# Patient Record
Sex: FEMALE | Race: WHITE | Employment: PART TIME | URBAN - METROPOLITAN AREA
[De-identification: names, ages, dates, MRNs, and addresses within clinical notes are randomized per-mention and may not be internally consistent; named-entity substitution may affect disease eponyms.]

---

## 2017-10-10 ENCOUNTER — OFFICE VISIT (OUTPATIENT)
Dept: ENDOCRINOLOGY | Facility: CLINIC | Age: 52
End: 2017-10-10

## 2017-10-10 VITALS
SYSTOLIC BLOOD PRESSURE: 147 MMHG | BODY MASS INDEX: 50.02 KG/M2 | DIASTOLIC BLOOD PRESSURE: 81 MMHG | HEART RATE: 61 BPM | WEIGHT: 293 LBS | HEIGHT: 64 IN

## 2017-10-10 DIAGNOSIS — E03.9 HYPOTHYROIDISM, UNSPECIFIED TYPE: Primary | ICD-10-CM

## 2017-10-10 DIAGNOSIS — E66.01 MORBID OBESITY (H): ICD-10-CM

## 2017-10-10 DIAGNOSIS — E03.9 HYPOTHYROIDISM, UNSPECIFIED TYPE: ICD-10-CM

## 2017-10-10 LAB
T4 FREE SERPL-MCNC: 1.37 NG/DL (ref 0.76–1.46)
TSH SERPL DL<=0.005 MIU/L-ACNC: 1.18 MU/L (ref 0.4–4)

## 2017-10-10 RX ORDER — OXYBUTYNIN CHLORIDE 5 MG/1
TABLET, EXTENDED RELEASE ORAL 2 TIMES DAILY
COMMUNITY

## 2017-10-10 RX ORDER — HYDROCODONE BITARTRATE AND ACETAMINOPHEN 5; 325 MG/1; MG/1
2 TABLET ORAL DAILY
COMMUNITY

## 2017-10-10 RX ORDER — FERROUS SULFATE 325(65) MG
325 TABLET ORAL
COMMUNITY

## 2017-10-10 RX ORDER — LORATADINE 10 MG/1
10 TABLET ORAL DAILY
COMMUNITY

## 2017-10-10 ASSESSMENT — ENCOUNTER SYMPTOMS
DISTURBANCES IN COORDINATION: 1
RECTAL PAIN: 0
RECTAL BLEEDING: 0
JAUNDICE: 0
POOR WOUND HEALING: 0
NECK PAIN: 1
HEADACHES: 1
INCREASED ENERGY: 1
NERVOUS/ANXIOUS: 1
STIFFNESS: 1
TACHYCARDIA: 0
SINUS PAIN: 1
HYPOTENSION: 0
SEIZURES: 0
NIGHT SWEATS: 1
DECREASED LIBIDO: 1
FEVER: 0
VOMITING: 0
LIGHT-HEADEDNESS: 1
WEAKNESS: 1
EXERCISE INTOLERANCE: 1
HYPERTENSION: 0
DIFFICULTY URINATING: 1
NAUSEA: 1
CONSTIPATION: 0
MUSCLE WEAKNESS: 1
ABDOMINAL PAIN: 1
PALPITATIONS: 0
POLYPHAGIA: 1
JOINT SWELLING: 1
NECK MASS: 0
DYSURIA: 0
SKIN CHANGES: 0
EYE IRRITATION: 1
BRUISES/BLEEDS EASILY: 0
LEG SWELLING: 1
TROUBLE SWALLOWING: 1
PANIC: 0
MYALGIAS: 1
SLEEP DISTURBANCES DUE TO BREATHING: 0
POLYDIPSIA: 1
LOSS OF CONSCIOUSNESS: 0
DECREASED CONCENTRATION: 1
DEPRESSION: 1
EYE PAIN: 0
BLOOD IN STOOL: 0
CLAUDICATION: 1
WEIGHT LOSS: 0
CHILLS: 1
DECREASED APPETITE: 1
PARALYSIS: 0
FATIGUE: 1
TREMORS: 0
SYNCOPE: 0
SWOLLEN GLANDS: 1
BOWEL INCONTINENCE: 1
HALLUCINATIONS: 0
HOARSE VOICE: 0
HEARTBURN: 1
HEMATURIA: 0
LEG PAIN: 1
TINGLING: 1
BLOATING: 1
MEMORY LOSS: 1
TASTE DISTURBANCE: 0
ALTERED TEMPERATURE REGULATION: 1
ORTHOPNEA: 0
DOUBLE VISION: 1
DIZZINESS: 1
SPEECH CHANGE: 1
NAIL CHANGES: 1
HOT FLASHES: 1
ARTHRALGIAS: 1
EYE REDNESS: 1
NUMBNESS: 1
DIARRHEA: 1
SORE THROAT: 0
BACK PAIN: 1
EYE WATERING: 0
SINUS CONGESTION: 0
INSOMNIA: 1
SMELL DISTURBANCE: 1
WEIGHT GAIN: 1
MUSCLE CRAMPS: 1

## 2017-10-10 NOTE — PATIENT INSTRUCTIONS
1,700 calorie meal plan to lose 1 lbs weekly without exercise based on REE calc of 2,200  Use meal replacements such as Brittany's meals, Lean Cuisines, Healthy Choice, Smart Ones, Weight Watchers Meals, and Slim Fast and Glucerna shakes and supplement with fresh fruits and vegetables  Please drink a lot of water daily. Most people typically need about 2 liters of water daily and more if they are exercising, have a large weight, or have a fever or illness. Add Crystal Light for flavoring if desired. But no pop with calories in it.  Please keep a food journal of what you eat, calories in what you eat, and mood and bring the journal with you to your next appointment.  Consider using applications for smart phones such as TVSmiles, Recurrent Energy, [employer], SugarSync, Tap&Track, and RelaxM.  Focus on wet volumetrics, meaning, eat more foods that are high in water and fiber such as fruits and vegetables in order to get that full feeling. These are also good for your overall health as well.  Check out Dr. Natalie Suero from [facility] - she has cookbooks with low calorie volumetric recipes  You can try Let's Dish to help you prepare meals for you and your family. Often times, the caloric and nutrition data and serving sizes are available for this food. This can be a time saving maneuver. The website can give you more information http://www.[employer].Easel/  Cob[employer]ivers has Let's Dish & fresh low calorie salads  Check out Hello Fresh at https://www.SkillBoost/food-boxes/classic-box/  Try Cooking Light recipes for low calorie meal preparation and planning  Other food plan options you can search for on the internet and check out include: Nhi HARDIN, [facility]  Please continue to see your health psychologist to discuss how depression and/or mood, anxiety impact your eating.    Medications associated with weight gain: norco, gabapentin, nortriptyline    Labs today: TSH, FT4 since weight change of 297 to 344 since 2/27/17  and thyroid medication needs can change with sig. Changes in body weight.
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
## 2017-10-10 NOTE — MR AVS SNAPSHOT
After Visit Summary   10/10/2017    Shannan Clifford    MRN: [id number]           Patient Information     Date Of Birth          1965        Visit Information        Provider Department      10/10/2017 1:20 PM Gena Bojorquez MD [facility]        Today's Diagnoses     Hypothyroidism, unspecified type    -  1    Morbid obesity (H)          Care Instructions    1,700 calorie meal plan to lose 1 lbs weekly without exercise based on REE calc of 2,200  Use meal replacements such as Brittany's meals, Lean Cuisines, Healthy Choice, Smart Ones, Weight Watchers Meals, and Slim Fast and Glucerna shakes and supplement with fresh fruits and vegetables  Please drink a lot of water daily. Most people typically need about 2 liters of water daily and more if they are exercising, have a large weight, or have a fever or illness. Add Crystal Light for flavoring if desired. But no pop with calories in it.  Please keep a food journal of what you eat, calories in what you eat, and mood and bring the journal with you to your next appointment.  Consider using applications for smart phones such as Microlaunchers, Xactium, EcTownUSA, LoseIt, Tap&Track, and RelaxM.  Focus on wet volumetrics, meaning, eat more foods that are high in water and fiber such as fruits and vegetables in order to get that full feeling. These are also good for your overall health as well.  Check out Dr. Natalie Suero from [facility] - she has cookbooks with low calorie volumetric recipes  You can try Let's Dish to help you prepare meals for you and your family. Often times, the caloric and nutrition data and serving sizes are available for this food. This can be a time saving maneuver. The website can give you more information http://www.Bsmark.Rentify/  C[employer] has Let's Dish & fresh low calorie salads  Check out Hello Fresh at https://www.ProNAi Therapeutics/food-boxes/classic-box/  Try Cooking Light recipes for low  calorie meal preparation and planning  Other food plan options you can search for on the internet and check out include: Nhi HARDIN, [employer] [place]  Please continue to see your health psychologist to discuss how depression and/or mood, anxiety impact your eating.    Medications associated with weight gain: norco, gabapentin, nortriptyline    Labs today: TSH, FT4 since weight change of 297 to 344 since 17 and thyroid medication needs can change with sig. Changes in body weight.                    Follow-ups after your visit        Follow-up notes from your care team     Return in about 3 months (around 1/10/2018).      Future tests that were ordered for you today     Open Future Orders        Priority Expected Expires Ordered    TSH Routine  10/10/2018 10/10/2017    T4 free Routine  10/10/2018 10/10/2017            Who to contact     Please call your clinic at [phone] to:    Ask questions about your health    Make or cancel appointments    Discuss your medicines    Learn about your test results    Speak to your doctor   If you have compliments or concerns about an experience at your clinic, or if you wish to file a complaint, please contact [facility] Patient Relations at [phone] or email us at [email or web address]         Additional Information About Your Visit        GiveForwardharFlash Networks Information     [employer] is an electronic gateway that provides easy, online access to your medical records. With [employer], you can request a clinic appointment, read your test results, renew a prescription or communicate with your care team.     To sign up for [employer] visit the website at [email or web address]   You will be asked to enter the access code listed below, as well as some personal information. Please follow the directions to create your username and password.     Your access code is: [email or web address]  Expires: 2017  1:32 PM     Your access code will  in 90 days. If you  "need help or a new code, please contact your [facility] or call [phone] for assistance.        Care EveryWhere ID     This is your Care EveryWhere ID. This could be used by other organizations to access your [place] medical records  [id number]        Your Vitals Were     Pulse Height BMI (Body Mass Index)             61 1.626 m (5' 4\") 59.08 kg/m2          Blood Pressure from Last 3 Encounters:   10/10/17 147/81   02/18/14 123/81   05/20/13 117/59    Weight from Last 3 Encounters:   10/10/17 (!) 156.1 kg (344 lb 3.2 oz)   02/18/14 (!) 161.9 kg (357 lb)   05/20/13 (!) 172.4 kg (380 lb)               Primary Care Provider Office Phone # Fax #    Scarlet Hernandez [phone]       [address] [address]        Equal Access to Services     THOMPSON SON : Hadii aad ku hadasho Soomaali, waaxda luqadaha, qaybta kaalmada adeegyada, waxay idiin hayfeliciano melo . So [facility] [phone].    ATENCIÓN: Si habla español, tiene a issa disposición servicios gratuitos de asistencia lingüística. [facility] [phone].    We comply with applicable federal civil rights laws and [place] laws. We do not discriminate on the basis of race, color, national origin, age, disability, sex, sexual orientation, or gender identity.            Thank you!     Thank you for choosing [facility]  for your care. Our goal is always to provide you with excellent care. Hearing back from our patients is one way we can continue to improve our services. Please take a few minutes to complete the written survey that you may receive in the mail after your visit with us. Thank you!             Your Updated Medication List - Protect others around you: Learn how to safely use, store and throw away your medicines at www.disposemymeds.org.          This list is accurate as of: 10/10/17  2:56 PM.  Always use your most recent med list.       "             Brand Name Dispense Instructions for use Diagnosis    ARMODAFINIL PO      Take 250 mg by mouth every morning        ferrous sulfate 325 (65 FE) MG tablet    IRON     Take 325 mg by mouth daily (with breakfast)        furosemide 20 MG tablet    LASIX     Take 20 mg by mouth daily.        GABAPENTIN PO      Take 800 mg by mouth 4 times daily.        HYDROcodone-acetaminophen 5-325 MG per tablet    NORCO     Take 2 tablets by mouth daily        hydroxychloroquine 200 MG tablet    PLAQUENIL     Take 200 mg by mouth 2 times daily.        INDERAL  MG 24 hr capsule   Generic drug:  propranolol      Take 120 mg by mouth daily        * levothyroxine 25 MCG tablet    SYNTHROID/LEVOTHROID     Take 1 tablet by mouth daily.        * LEVOTHROID PO      Take 200 mcg by mouth        loratadine 10 MG tablet    CLARITIN     Take 10 mg by mouth daily        meloxicam 15 MG tablet    MOBIC     Take 15 mg by mouth daily.        MICROGESTIN 1.5-30 MG-MCG per tablet   Generic drug:  norethindrone-ethinyl estradiol      Take 1 tablet by mouth daily.        nortriptyline 50 MG capsule    PAMELOR     Take 100 mg by mouth At Bedtime        oxybutynin 5 MG 24 hr tablet    DITROPAN-XL     Take by mouth 2 times daily        pantoprazole 40 MG EC tablet    PROTONIX     Take 40 mg by mouth daily        phentermine 37.5 MG tablet    ADIPEX-P    30 tablet    Take 1 tablet by mouth every morning (before breakfast).    Morbid obesity (H)       PROCHLORPERAZINE RE      Place  rectally as needed.        PROZAC PO      Take 40 mg by mouth daily        RELPAX 40 MG tablet   Generic drug:  eletriptan      Take 40 mg by mouth at onset of headache.        * rOPINIRole 0.5 MG tablet    REQUIP     Take  by mouth 2 times daily.        * rOPINIRole 2 MG tablet    REQUIP     Take 2 mg by mouth At Bedtime.        ZONISAMIDE PO           * Notice:  This list has 4 medication(s) that are the same as other medications prescribed for you. Read the  directions carefully, and ask your doctor or other care provider to review them with you.
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
## 2017-12-20 ENCOUNTER — APPOINTMENT (OUTPATIENT)
Dept: GENERAL RADIOLOGY | Facility: CLINIC | Age: 52
End: 2017-12-20
Attending: EMERGENCY MEDICINE
Payer: COMMERCIAL

## 2017-12-20 ENCOUNTER — HOSPITAL ENCOUNTER (EMERGENCY)
Facility: CLINIC | Age: 52
Discharge: HOME | End: 2017-12-20
Attending: EMERGENCY MEDICINE | Admitting: EMERGENCY MEDICINE
Payer: COMMERCIAL

## 2017-12-20 VITALS
SYSTOLIC BLOOD PRESSURE: 139 MMHG | HEART RATE: 63 BPM | RESPIRATION RATE: 16 BRPM | HEIGHT: 64 IN | DIASTOLIC BLOOD PRESSURE: 89 MMHG | TEMPERATURE: 97.8 F | OXYGEN SATURATION: 100 %

## 2017-12-20 DIAGNOSIS — S93.401A SPRAIN OF RIGHT ANKLE, UNSPECIFIED LIGAMENT, INITIAL ENCOUNTER: ICD-10-CM

## 2017-12-20 DIAGNOSIS — S63.501A WRIST SPRAIN, RIGHT, INITIAL ENCOUNTER: ICD-10-CM

## 2017-12-20 DIAGNOSIS — M25.571 PAIN IN JOINT, ANKLE AND FOOT, RIGHT: ICD-10-CM

## 2017-12-20 DIAGNOSIS — S82.391A OTHER CLOSED FRACTURE OF DISTAL END OF RIGHT TIBIA, INITIAL ENCOUNTER: Primary | ICD-10-CM

## 2017-12-20 DIAGNOSIS — M25.531 RIGHT WRIST PAIN: ICD-10-CM

## 2017-12-20 PROCEDURE — 73560 X-RAY EXAM OF KNEE 1 OR 2: CPT | Mod: LT

## 2017-12-20 PROCEDURE — 29515 APPLICATION SHORT LEG SPLINT: CPT | Mod: RT

## 2017-12-20 PROCEDURE — 73610 X-RAY EXAM OF ANKLE: CPT | Mod: RT

## 2017-12-20 PROCEDURE — 99285 EMERGENCY DEPT VISIT HI MDM: CPT | Mod: 25

## 2017-12-20 PROCEDURE — 73110 X-RAY EXAM OF WRIST: CPT | Mod: RT

## 2017-12-20 PROCEDURE — 25000132 ZZH RX MED GY IP 250 OP 250 PS 637: Performed by: EMERGENCY MEDICINE

## 2017-12-20 RX ORDER — HYDROCODONE BITARTRATE AND ACETAMINOPHEN 5; 325 MG/1; MG/1
2 TABLET ORAL ONCE
Status: COMPLETED | OUTPATIENT
Start: 2017-12-20 | End: 2017-12-20

## 2017-12-20 RX ADMIN — HYDROCODONE BITARTRATE AND ACETAMINOPHEN 2 TABLET: 5; 325 TABLET ORAL at 10:42

## 2017-12-20 NOTE — ED AVS SNAPSHOT
Emergency Department    [address] [facility] [address]    Phone:  [phone]    Fax:  [phone]                                       Shannan Clifford   MRN: [id number]    Department:   Emergency Department   Date of Visit:  12/20/2017           Patient Information     Date Of Birth          1965        Your diagnoses for this visit were:     Other closed fracture of distal end of right tibia, initial encounter     Pain in joint, ankle and foot, right     Right wrist pain     Wrist sprain, right, initial encounter     Sprain of right ankle, unspecified ligament, initial encounter        You were seen by Thomas Hoffman MD.      Follow-up Information     Follow up with Scarlet Hernandez. Schedule an appointment as soon as possible for a visit in 1 week.    Specialty:  Pediatrics    Why:  For re-check of right ankle and right wrist.  May require repeat right ankle xrays at that time.    Contact information:    [address] [address]  [phone]          Discharge Instructions         Understanding Ankle Sprain    The ankle is the joint where the leg and foot meet. Bones are held in place by connective tissue called ligaments. When ankle ligaments are stretched to the point of pain and injury, it is called an ankle sprain. A sprain can tear the ligaments. These tears can be very small but still cause pain. Ankle sprains can be mild or severe.  What causes an ankle sprain?  A sprain may occur when you twist your ankle or bend it too far. This can happen when you stumble or fall. Things that can make an ankle sprain more likely include:    Having had an ankle sprain before    Playing sports that involve running and jumping. Or playing contact sports such as football or hockey.    Wearing shoes that don t support your feet and ankles well    Having ankles with poor strength and flexibility  Symptoms of an ankle sprain  Symptoms may include:    Pain  or soreness in the ankle    Swelling    Redness or bruising    Not being able to walk or put weight on the affected foot    Reduced range of motion in the ankle    A popping or tearing feeling at the time the sprain occurs    An abnormal or dislocated look to the ankle    Instability or too much range of motion in the ankle  Treatment for an ankle sprain  Treatment focuses on reducing pain and swelling, and avoiding further injury. Treatments may include:    Resting the ankle. Avoid putting weight on it. This may mean using crutches until the sprain heals.    Prescription or over-the-counter pain medicines. These help reduce swelling and pain.    Cold packs. These help reduce pain and swelling.    Raising your ankle above your heart. This helps reduce swelling.    Wrapping the ankle with an elastic bandage or ankle brace. This helps reduce swelling and gives some support to the ankle. In rare cases, you may need a cast or boot.    Stretching and other exercises. These improve flexibility and strength.    Heat packs. These may be recommended before doing ankle exercises.  Possible complications of an ankle sprain  An ankle that has been weakened by a sprain can be more likely to have repeated sprains afterward. Doing exercises to strengthen your ankle and improve balance can reduce your risk for repeated sprains. Other possible complications are long-term (chronic) pain or an ankle that remains unstable.  When to call your healthcare provider  Call your healthcare provider right away if you have any of these:    Fever of 100.4 F (38 C) or higher, or as directed    Pain, numbness, discoloration, or coldness in the foot or toes    Pain that gets worse    Symptoms that don t get better, or get worse    New symptoms   Date Last Reviewed: 3/10/2016    6488-1676 [employer]. [address] [place] [address]. All rights reserved. This information is not intended as a substitute for professional  medical care. Always follow your healthcare professional's instructions.          Discharge References/Attachments     WRIST SPRAIN (ENGLISH)      Future Appointments        Provider Department Dept Phone Center    1/16/2018 1:00 PM Gena Bojorquez MD [facility] [phone] [facility]      24 Hour Appointment Hotline       To make an appointment at any [facility], call [phone] ([phone]). If you don't have a family doctor or clinic, we will help you find one. [facility] are conveniently located to serve the needs of you and your family.             Review of your medicines      Our records show that you are taking the medicines listed below. If these are incorrect, please call your family doctor or clinic.        Dose / Directions Last dose taken    ARMODAFINIL PO   Dose:  250 mg        Take 250 mg by mouth every morning   Refills:  0        ferrous sulfate 325 (65 FE) MG tablet   Commonly known as:  IRON   Dose:  325 mg        Take 325 mg by mouth daily (with breakfast)   Refills:  0        furosemide 20 MG tablet   Commonly known as:  LASIX   Dose:  20 mg        Take 20 mg by mouth daily.   Refills:  0        GABAPENTIN PO   Dose:  800 mg        Take 800 mg by mouth 4 times daily.   Refills:  0        HYDROcodone-acetaminophen 5-325 MG per tablet   Commonly known as:  NORCO   Dose:  2 tablet        Take 2 tablets by mouth daily   Refills:  0        hydroxychloroquine 200 MG tablet   Commonly known as:  PLAQUENIL   Dose:  200 mg        Take 200 mg by mouth 2 times daily.   Refills:  0        INDERAL  MG 24 hr capsule   Dose:  120 mg   Indication:  Migraine Headache   Generic drug:  propranolol        Take 120 mg by mouth daily   Refills:  0        * levothyroxine 25 MCG tablet   Commonly known as:  SYNTHROID/LEVOTHROID   Dose:  1 tablet        Take 1 tablet by mouth daily.   Refills:  0        * LEVOTHROID PO   Dose:  200 mcg        Take 200 mcg by mouth    Refills:  0        loratadine 10 MG tablet   Commonly known as:  CLARITIN   Dose:  10 mg        Take 10 mg by mouth daily   Refills:  0        meloxicam 15 MG tablet   Commonly known as:  MOBIC   Dose:  15 mg        Take 15 mg by mouth daily.   Refills:  0        MICROGESTIN 1.5-30 MG-MCG per tablet   Dose:  1 tablet   Generic drug:  norethindrone-ethinyl estradiol        Take 1 tablet by mouth daily.   Refills:  0        nortriptyline 50 MG capsule   Commonly known as:  PAMELOR   Dose:  100 mg        Take 100 mg by mouth At Bedtime   Refills:  0        oxybutynin 5 MG 24 hr tablet   Commonly known as:  DITROPAN-XL        Take by mouth 2 times daily   Refills:  0        pantoprazole 40 MG EC tablet   Commonly known as:  PROTONIX   Dose:  40 mg        Take 40 mg by mouth daily   Refills:  0        phentermine 37.5 MG tablet   Commonly known as:  ADIPEX-P   Dose:  37.5 mg   Quantity:  30 tablet        Take 1 tablet by mouth every morning (before breakfast).   Refills:  0        PROCHLORPERAZINE RE        Place  rectally as needed.   Refills:  0        PROZAC PO   Dose:  40 mg        Take 40 mg by mouth daily   Refills:  0        RELPAX 40 MG tablet   Dose:  40 mg   Generic drug:  eletriptan        Take 40 mg by mouth at onset of headache.   Refills:  0        * rOPINIRole 0.5 MG tablet   Commonly known as:  REQUIP        Take  by mouth 2 times daily.   Refills:  0        * rOPINIRole 2 MG tablet   Commonly known as:  REQUIP   Dose:  2 mg        Take 2 mg by mouth At Bedtime.   Refills:  0        ZONISAMIDE PO        Refills:  0        * Notice:  This list has 4 medication(s) that are the same as other medications prescribed for you. Read the directions carefully, and ask your doctor or other care provider to review them with you.            Procedures and tests performed during your visit     Ankle XR, G/E 3 views, right    Wrist XR, G/E 3 views, right    XR Knee Left 1/2 Views      Orders Needing Specimen  Collection     None      Pending Results     No orders found from 12/18/2017 to 12/21/2017.            Pending Culture Results     No orders found from 12/18/2017 to 12/21/2017.            Pending Results Instructions     If you had any lab results that were not finalized at the time of your Discharge, you can call the ED Lab Result RN at [phone]. You will be contacted by this team for any positive Lab results or changes in treatment. The nurses are available 7 days a week from 10A to 6:30P.  You can leave a message 24 hours per day and they will return your call.        Test Results From Your Hospital Stay        12/20/2017 11:36 AM      Narrative     RIGHT WRIST THREE OR MORE VIEWS  12/20/2017 11:27 AM    HISTORY:  Fall on outstretched right hand.     COMPARISON:  None.        Impression     IMPRESSION:  No distal radius fracture identified. There is a  transverse band of density in the distal ulna, likely a normal  variant; a very subtle compression fracture not excluded but  considered unlikely. Subchondral degenerative cyst in the lunate.     RYAN PATTON MD         12/20/2017 11:36 AM      Narrative     RIGHT ANKLE THREE OR MORE VIEWS  12/20/2017 11:27 AM    HISTORY:  Right ankle pain after falling.    COMPARISON:  None.        Impression     IMPRESSION:  Mild soft tissue swelling over the lateral malleolus.  Thin linear lucency in the posterior distal tibia on the lateral view  could represent a subtle nondisplaced fracture if this correlates  clinically. It could represent overlapping accessory ossicle.  Degenerative changes at the ankle with cephalad and lateral joint  space narrowing. Pes planus. Prominent inferior calcaneal spur.    RYAN PATTON MD               12/20/2017 11:36 AM      Narrative     LEFT KNEE ONE TO TWO VIEWS  12/20/2017 11:28 AM    HISTORY:  Fall, left knee pain, chronic pain.     COMPARISON:  None.        Impression     IMPRESSION:  No acute osseous process. Small joint  effusion.  Three-compartment osseous degenerative changes with near complete  medial joint space loss.     RYAN PATTON MD                Clinical Quality Measure: Blood Pressure Screening     Your blood pressure was checked while you were in the emergency department today. The last reading we obtained was  BP: 139/66 . Please read the guidelines below about what these numbers mean and what you should do about them.  If your systolic blood pressure (the top number) is less than 120 and your diastolic blood pressure (the bottom number) is less than 80, then your blood pressure is normal. There is nothing more that you need to do about it.  If your systolic blood pressure (the top number) is 120-139 or your diastolic blood pressure (the bottom number) is 80-89, your blood pressure may be higher than it should be. You should have your blood pressure rechecked within a year by a primary care provider.  If your systolic blood pressure (the top number) is 140 or greater or your diastolic blood pressure (the bottom number) is 90 or greater, you may have high blood pressure. High blood pressure is treatable, but if left untreated over time it can put you at risk for heart attack, stroke, or kidney failure. You should have your blood pressure rechecked by a primary care provider within the next 4 weeks.  If your provider in the emergency department today gave you specific instructions to follow-up with your doctor or provider even sooner than that, you should follow that instruction and not wait for up to 4 weeks for your follow-up visit.        Thank you for choosing [facility]       Thank you for choosing [facility] for your care. Our goal is always to provide you with excellent care. Hearing back from our patients is one way we can continue to improve our services. Please take a few minutes to complete the written survey that you may receive in the mail after you visit with us. Thank you!        MyChart Information      "[employer] lets you send messages to your doctor, view your test results, renew your prescriptions, schedule appointments and more. To sign up, go to [email or web address] . Click on \"Log in\" on the left side of the screen, which will take you to the Welcome page. Then click on \"Sign up Now\" on the right side of the page.     You will be asked to enter the access code listed below, as well as some personal information. Please follow the directions to create your username and password.     Your access code is: [email or web address]  Expires: 3/20/2018 12:16 PM     Your access code will  in 90 days. If you need help or a new code, please call your [facility] clinic or [phone].        Care EveryWhere ID     This is your Care EveryWhere ID. This could be used by other organizations to access your [facility] medical records  [id number]        Equal Access to Services     THOMPSON SON : Yohannes Vergara, tresa payton, gavin coello, tristan melo . So [facility] [phone].    ATENCIÓN: Si habla español, tiene a issa disposición servicios gratuitos de asistencia lingüística. Llame al [phone].    We comply with applicable federal civil rights laws and [place] laws. We do not discriminate on the basis of race, color, national origin, age, disability, sex, sexual orientation, or gender identity.            After Visit Summary       This is your record. Keep this with you and show to your community pharmacist(s) and doctor(s) at your next visit.                  "

## 2017-12-20 NOTE — ED AVS SNAPSHOT
Emergency Department    [address] [place] [address]    Phone:  [phone]    Fax:  [phone]                                       Shannan Clifford   MRN: [id number]    Department:   Emergency Department   Date of Visit:  12/20/2017           After Visit Summary Signature Page     I have received my discharge instructions, and my questions have been answered. I have discussed any challenges I see with this plan with the nurse or doctor.    ..........................................................................................................................................  Patient/Patient Representative Signature      ..........................................................................................................................................  Patient Representative Print Name and Relationship to Patient    ..................................................               ................................................  Date                                            Time    ..........................................................................................................................................  Reviewed by Signature/Title    ...................................................              ..............................................  Date                                                            Time

## 2017-12-20 NOTE — DISCHARGE INSTRUCTIONS
Understanding Ankle Sprain    The ankle is the joint where the leg and foot meet. Bones are held in place by connective tissue called ligaments. When ankle ligaments are stretched to the point of pain and injury, it is called an ankle sprain. A sprain can tear the ligaments. These tears can be very small but still cause pain. Ankle sprains can be mild or severe.  What causes an ankle sprain?  A sprain may occur when you twist your ankle or bend it too far. This can happen when you stumble or fall. Things that can make an ankle sprain more likely include:    Having had an ankle sprain before    Playing sports that involve running and jumping. Or playing contact sports such as football or hockey.    Wearing shoes that don t support your feet and ankles well    Having ankles with poor strength and flexibility  Symptoms of an ankle sprain  Symptoms may include:    Pain or soreness in the ankle    Swelling    Redness or bruising    Not being able to walk or put weight on the affected foot    Reduced range of motion in the ankle    A popping or tearing feeling at the time the sprain occurs    An abnormal or dislocated look to the ankle    Instability or too much range of motion in the ankle  Treatment for an ankle sprain  Treatment focuses on reducing pain and swelling, and avoiding further injury. Treatments may include:    Resting the ankle. Avoid putting weight on it. This may mean using crutches until the sprain heals.    Prescription or over-the-counter pain medicines. These help reduce swelling and pain.    Cold packs. These help reduce pain and swelling.    Raising your ankle above your heart. This helps reduce swelling.    Wrapping the ankle with an elastic bandage or ankle brace. This helps reduce swelling and gives some support to the ankle. In rare cases, you may need a cast or boot.    Stretching and other exercises. These improve flexibility and strength.    Heat packs. These may be recommended before doing  ankle exercises.  Possible complications of an ankle sprain  An ankle that has been weakened by a sprain can be more likely to have repeated sprains afterward. Doing exercises to strengthen your ankle and improve balance can reduce your risk for repeated sprains. Other possible complications are long-term (chronic) pain or an ankle that remains unstable.  When to call your healthcare provider  Call your healthcare provider right away if you have any of these:    Fever of 100.4 F (38 C) or higher, or as directed    Pain, numbness, discoloration, or coldness in the foot or toes    Pain that gets worse    Symptoms that don t get better, or get worse    New symptoms   Date Last Reviewed: 3/10/2016    9327-6530 [employer]. [address] [place] [address], [place] [address]. All rights reserved. This information is not intended as a substitute for professional medical care. Always follow your healthcare professional's instructions.

## 2017-12-20 NOTE — ED NOTES
Bed: ED07  Expected date:   Expected time:   Means of arrival:   Comments:  423  52 F fall/wrist pain  1008

## 2017-12-20 NOTE — ED PROVIDER NOTES
History     Chief Complaint:  Fall      HPI   Shannan Clifford is a right-hand dominant 52 year old female with a history of fibromyalgia and dystonia who presents to the emergency department today for evaluation of injuries sustained in a fall. The patient reports that she was on the way to Urgent Care for evaluation of her chronic left knee pain this morning when she slipped and mechanically fell onto her right wrist. She describes right wrist pain, bilateral knee pain, and right ankle pain. She denies directly impacting her knees during her fall or hitting her head. She notes that she usually takes two Norco daily for her fibromyalgia but states that she did not do so yet today. She states that her pain medicine is managed by her primary care provider. The patient notes that she was able to walk after this fall but with pain. She reports that she was going to urgent care for left knee pain, intermittent swelling, and instability. She notes that she takes synthroid for hypothyroidism.    Allergies:  Topiramate  Wellbutrin [Bupropion Hydrobromide]     Medications:    Claritin  Armodafinil  Oxybutynin  Norco  Zonisamide  Prozac  Phentermine  Synthroid  Levothroid  Inderal  Gabapentin  Protonix  Mobic  Plaquenil  Requip  Pamelor  Microgestin  Prochlorperazine  Replax  Lasix    Past Medical History:    Anemia  Dystonia  Fatty liver disease, nonalcoholic  Fibromyalgia  Hypothyroid  Migraine  Obstructive sleep apnea  Osteoarthritis  Restless leg syndrome  Vertigo    Past Surgical History:    Deviated septum  Myringotomy, insert tube bilateral, combined    Family History:    Father: Obesity, hypertension, heart disease, lipids  Mother: Obesity, ovarian cancer  Paternal Grandfather: Obesity, hypertension ,diabetes, lipids, heart disease  Paternal Grandmother: Skin cancer, cerebrovascular disease, lipids, heart disease  Maternal Grandfather: Prostate cancer  Maternal Grandmother: CHF, bone cancer  Paternal Uncle:  "MI    Social History:  Smoking Status: Never Smoker  Smokeless Tobacco: Never Used  Alcohol Use: Positive  Marital Status:  Single     Review of Systems   HENT:        Negative for head injury.   Musculoskeletal:        Positive for right wrist pain, bilateral knee pain, and right ankle pain.   All other systems reviewed and are negative.    Physical Exam     Patient Vitals for the past 24 hrs:   BP Temp Temp src Pulse Resp SpO2 Height   12/20/17 1225 139/89 - - 63 16 - -   12/20/17 1157 119/80 - - 71 16 - -   12/20/17 1019 139/66 97.8  F (36.6  C) Oral 73 16 100 % 1.626 m (5' 4\")     Physical Exam  General: Resting uncomfortably on the gurney, morbidly obese  Head:  The scalp, face, and head appear normal  Eyes:  The pupils are normal    Conjunctivae and sclera appear normal  ENT:    The nose is normal    Ears/pinnae are normal  Neck:  Normal range of motion  Cardiovascular: Regular rate and rhythm. No murmur.  Respiratory: Clear to auscultation bilaterally.  MS:  Right Hand:    The finger flexors (FDS/FDP) are intact    The finger extensors are intact    The thumb exam is normal, including:    Adduction, abduction, flexion, extension, opposition    There are no sensory deficits    Median, Ulnar, and Radial nerve function is normal    Radial artery pulsations are normal    Capillary refill is normal  MS:  Right Leg:    Thigh:     Normal    Knee:     Normal; there is no effusion    Proximal fibula:   Normal and non-tender    Tibia:     Long shaft of the tibia is normal    Alba-lateral malleolar ligaments: Mild swelling and tender    Medial collateral (Deltoid) ligament: Normal    Lateral malleolus:   Mildly-tender    Medial Malleolus:   Non-tender      Achilles tendon:   Normal, intact    5th MT base:    Normal and non-tender    Foot bones:    Normal and non-tender       Capillary Refill:   Normal.    Sensation:    Foot and ankle sensation are normal  Left knee: No effusion compared to right. Limited exam due to " morbid obesity. Tenderness to anterior compression of patella. Patient ambulatory after fall.  Skin:  No rash or lesions noted.  Neuro: Speech is normal and fluent  Psych:  Awake. Alert.  Normal affect.      Appropriate interactions    Emergency Department Course     Imaging:  Radiology findings were communicated with the patient who voiced understanding of the findings.    Ankle XR, G/E 3 views, right  Mild soft tissue swelling over the lateral malleolus.  Thin linear lucency in the posterior distal tibia on the lateral view  could represent a subtle nondisplaced fracture if this correlates  clinically. It could represent overlapping accessory ossicle.  Degenerative changes at the ankle with cephalad and lateral joint  space narrowing. Pes planus. Prominent inferior calcaneal spur.  RYAN PATTON MD  Reading per radiology    Wrist XR, G/E 3 views, right  No distal radius fracture identified. There is a  transverse band of density in the distal ulna, likely a normal  variant; a very subtle compression fracture not excluded but  considered unlikely. Subchondral degenerative cyst in the lunate.   RYAN PATTON MD  Reading per radiology    XR Knee Left 1/2 Views  No acute osseous process. Small joint effusion.  Three-compartment osseous degenerative changes with near complete  medial joint space loss.   RYAN PATTON MD    Procedures:  PROCEDURE: Ankle Gel Splint Placement.    Ankle Gel Splint was applied to the right lower extremity and after placement I checked and adjusted the fit to ensure proper positioning.  The patient was more comfortable with the splint in place.  Sensation and circulation are intact after splint placement.    PROCEDURE: Wrist Splint Placement.    Wrist splint was applied to the right upper extremity and after placement I checked and adjusted the fit to ensure proper positioning.  The patient was more comfortable with the splint in place.  Sensation and circulation are intact after splint  placement.    Interventions:  1042 Norco 5-325 mg two tablets PO    Emergency Department Course:  Nursing notes and vitals reviewed.  I performed an exam of the patient as documented above.   The patient was sent for a Ankle XR, G/E 3 views, right, Wrist XR, G/E 3 views, right, and XR Knee Left 1/2 Views while in the emergency department, results above.   1154 I rechecked the patient.  I discussed the treatment plan with the patient. They expressed understanding of this plan and consented to discharge. They will be discharged home with instructions for care and follow up. In addition, the patient will return to the emergency department if their symptoms persist, worsen, if new symptoms arise or if there is any concern.  All questions were answered.  I personally reviewed the laboratory results with the patient and answered all related questions prior to discharge.    Impression & Plan      Medical Decision Making:  Shannan Clifford is a 52 year old female who presents with a fall on outstretched right hand. Patient with right ankle pain, right wrist pain, and left knee pain. Patient with chronic left knee pain. X-ray shows no acute fracture. There is a small effusion. No overlying redness or concern for infection to the left knee. It does look comparable to the right although patient is morbidly obese and physical exam makes full knee evaluation difficult to interpret. Due to patient's chronic joint discomfort, I suspect this is likely associated with that at this time. In the setting of an acute fall I am much more concerned about the right wrist and right ankle today, where she had likely right ankle sprain and right wrist sprain. Right wrist x-ray shows no distal radius fracture. Patient with no pain or tenderness to the ulnar aspect of the wrist and had full range of motion. Low concern for a subtle compression fracture as stated in the x-ray read. In conjunction with the clinical exam, I suspect this is most  likely a wrist sprain. Patient will be placed in a removable wrist splint for comfort.     Patient's right ankle shows some mild soft tissue swelling over the lateral malleolus. There is a small lucency in the posterior distal tibia that could be a subtle, non-displaced fracture. I suspect this is likely from a moderate to severe sprain of the right ankle. Due to patient's morbid obesity, I was unable to provide a walking post-operative boot for the patient. This would have constricted the proximal portion of her lower leg too much and was too uncomfortable to maintain. We did opt to provide a ankle gel splint which helped to provide comfort to the ankle joint. With no obvious deformities to either extremities, it is reasonable to continue with tylenol and ibuprofen and other out-patient medications as patient already does.  Will f/u with PCP in one week for re-assessment of right ankle and likely additional xray imaging at that time to ensure no progression of injury. Patient understood close return precautions for development of worsening pain. All questions answered prior to discharge. Discharged home.    Diagnosis:    ICD-10-CM    1. Other closed fracture of distal end of right tibia, initial encounter S82.391A    2. Pain in joint, ankle and foot, right M25.571    3. Right wrist pain M25.531    4. Wrist sprain, right, initial encounter S63.501A    5. Sprain of right ankle, unspecified ligament, initial encounter S93.401A        Disposition:  The patient is discharged to home.    Scribe Disclosure:  I, Benito Lima, am serving as a scribe at 12:11 PM on 12/20/2017 to document services personally performed by Thomas Hoffman MD based on my observations and the provider's statements to me.   EMERGENCY DEPARTMENT       Thomas Hoffman MD  12/20/17 9705

## 2017-12-20 NOTE — LETTER
December 20, 2017      To Whom It May Concern:      Shannan Clifford was seen in our Emergency Department today, 12/20/17.  I expect her condition to improve over the next 5 days.  She may return to work after 12/25/2017.    Sincerely,        Thomas Hoffman MD

## 2018-01-05 ENCOUNTER — HOSPITAL ENCOUNTER (EMERGENCY)
Facility: CLINIC | Age: 53
Discharge: HOME | End: 2018-01-05
Attending: EMERGENCY MEDICINE | Admitting: EMERGENCY MEDICINE
Payer: COMMERCIAL

## 2018-01-05 VITALS
HEIGHT: 64 IN | TEMPERATURE: 98 F | BODY MASS INDEX: 50.02 KG/M2 | OXYGEN SATURATION: 97 % | DIASTOLIC BLOOD PRESSURE: 90 MMHG | RESPIRATION RATE: 16 BRPM | SYSTOLIC BLOOD PRESSURE: 131 MMHG | HEART RATE: 84 BPM | WEIGHT: 293 LBS

## 2018-01-05 DIAGNOSIS — K52.9 GASTROENTERITIS: ICD-10-CM

## 2018-01-05 LAB
ANION GAP SERPL CALCULATED.3IONS-SCNC: 5 MMOL/L (ref 3–14)
BASOPHILS # BLD AUTO: 0 10E9/L (ref 0–0.2)
BASOPHILS NFR BLD AUTO: 0.2 %
BUN SERPL-MCNC: 13 MG/DL (ref 7–30)
CALCIUM SERPL-MCNC: 8.8 MG/DL (ref 8.5–10.1)
CHLORIDE SERPL-SCNC: 106 MMOL/L (ref 94–109)
CO2 SERPL-SCNC: 30 MMOL/L (ref 20–32)
CREAT SERPL-MCNC: 0.55 MG/DL (ref 0.52–1.04)
DIFFERENTIAL METHOD BLD: NORMAL
EOSINOPHIL # BLD AUTO: 0.1 10E9/L (ref 0–0.7)
EOSINOPHIL NFR BLD AUTO: 1 %
ERYTHROCYTE [DISTWIDTH] IN BLOOD BY AUTOMATED COUNT: 12.8 % (ref 10–15)
GFR SERPL CREATININE-BSD FRML MDRD: >90 ML/MIN/1.7M2
GLUCOSE SERPL-MCNC: 88 MG/DL (ref 70–99)
HCT VFR BLD AUTO: 39 % (ref 35–47)
HGB BLD-MCNC: 13.8 G/DL (ref 11.7–15.7)
IMM GRANULOCYTES # BLD: 0 10E9/L (ref 0–0.4)
IMM GRANULOCYTES NFR BLD: 0.4 %
LYMPHOCYTES # BLD AUTO: 1.7 10E9/L (ref 0.8–5.3)
LYMPHOCYTES NFR BLD AUTO: 20.5 %
MCH RBC QN AUTO: 29.9 PG (ref 26.5–33)
MCHC RBC AUTO-ENTMCNC: 35.4 G/DL (ref 31.5–36.5)
MCV RBC AUTO: 84 FL (ref 78–100)
MONOCYTES # BLD AUTO: 0.6 10E9/L (ref 0–1.3)
MONOCYTES NFR BLD AUTO: 7.2 %
NEUTROPHILS # BLD AUTO: 5.8 10E9/L (ref 1.6–8.3)
NEUTROPHILS NFR BLD AUTO: 70.7 %
NRBC # BLD AUTO: 0 10*3/UL
NRBC BLD AUTO-RTO: 0 /100
PLATELET # BLD AUTO: 218 10E9/L (ref 150–450)
POTASSIUM SERPL-SCNC: 3.8 MMOL/L (ref 3.4–5.3)
RBC # BLD AUTO: 4.62 10E12/L (ref 3.8–5.2)
SODIUM SERPL-SCNC: 141 MMOL/L (ref 133–144)
WBC # BLD AUTO: 8.2 10E9/L (ref 4–11)

## 2018-01-05 PROCEDURE — 85025 COMPLETE CBC W/AUTO DIFF WBC: CPT | Performed by: EMERGENCY MEDICINE

## 2018-01-05 PROCEDURE — 25000125 ZZHC RX 250: Performed by: EMERGENCY MEDICINE

## 2018-01-05 PROCEDURE — 99283 EMERGENCY DEPT VISIT LOW MDM: CPT

## 2018-01-05 PROCEDURE — 80048 BASIC METABOLIC PNL TOTAL CA: CPT | Performed by: EMERGENCY MEDICINE

## 2018-01-05 RX ORDER — ONDANSETRON 4 MG/1
4 TABLET, ORALLY DISINTEGRATING ORAL EVERY 6 HOURS PRN
Qty: 10 TABLET | Refills: 0 | Status: SHIPPED | OUTPATIENT
Start: 2018-01-05 | End: 2018-01-08

## 2018-01-05 RX ORDER — ONDANSETRON 4 MG/1
4 TABLET, ORALLY DISINTEGRATING ORAL ONCE
Status: COMPLETED | OUTPATIENT
Start: 2018-01-05 | End: 2018-01-05

## 2018-01-05 RX ORDER — LOPERAMIDE HYDROCHLORIDE 2 MG/1
2 TABLET ORAL 4 TIMES DAILY PRN
Qty: 20 TABLET | Refills: 0 | Status: SHIPPED | OUTPATIENT
Start: 2018-01-05

## 2018-01-05 RX ADMIN — ONDANSETRON 4 MG: 4 TABLET, ORALLY DISINTEGRATING ORAL at 20:44

## 2018-01-05 ASSESSMENT — ENCOUNTER SYMPTOMS
VOMITING: 1
NAUSEA: 1
DIARRHEA: 1
FEVER: 0

## 2018-01-05 NOTE — ED AVS SNAPSHOT
Emergency Department    [address] [place] [address]    Phone:  [phone]    Fax:  [phone]                                       hSannan Clifford   MRN: [id number]    Department:   Emergency Department   Date of Visit:  1/5/2018           Patient Information     Date Of Birth          1965        Your diagnoses for this visit were:     Gastroenteritis        You were seen by Tony Vasquez MD.      Follow-up Information     Follow up with Scarlet Hernandez In 1 week.    Specialty:  Pediatrics    Why:  As needed    Contact information:    [address] [address] [phone]          Discharge Instructions       Discharge Instructions  Gastroenteritis    You have been seen today for vomiting and diarrhea, called gastroenteritis or the stomach flu. This is usually caused by a virus, but some bacteria, parasites, medicines or other medical conditions can cause similar symptoms. At this time your doctor does not find that your vomiting and diarrhea is a sign of anything dangerous or life-threatening.  However, sometimes the signs of serious illness do not show up right away.  If you have new or worse symptoms, you may need to be seen again in the Emergency Department or by your primary doctor. Remember that serious problems like appendicitis can look like gastroenteritis at first.       Return to the Emergency Department if:    You keep throwing up and you are not able to keep liquids down.    You feel you are getting dehydrated, such as being very thirsty, not urinating at least every 8-12 hours, or feeling faint or lightheaded.     You develop a new fever, or your fever continues for more than 2 days.    You have belly pain that seems worse than cramps, is in one spot, or is getting worse over time.     You have blood in your vomit or in your diarrhea.    You feel very weak.    You are not starting to improve within 24 hours of  your visit here.    What can I do to help myself?    The most important thing to do is to drink clear liquids.  If you have been vomiting a lot, it is best to have only small, frequent sips of liquids.  Drinking too much at once may cause more vomiting. If you are vomiting often, you must replace minerals, sodium and potassium lost with your illness. Pedialyte  and sports drinks can help you replace these minerals.  You can also drink clear liquids such as water, weak tea, apple juice, and 7-Up . Avoid acid liquids (orange), caffeine (coffee) or alcohol. Do not drink milk until you no longer have diarrhea.    After liquids are staying down, you may start eating mild foods. Soda crackers, toast, plain noodles, gelatin, applesauce and bananas are good first choices.  Avoid foods that have acid, are spicy, fatty or fibrous (such as meats, coarse grains, vegetables). You may start eating these foods again in about 3 days when you are better.    Sometimes treatment includes prescription medicine to prevent nausea and vomiting and to prevent diarrhea. If your doctor prescribes these for you, take them as directed.    Nonprescription medicine is available for the treatment of diarrhea and can be very effective.  If you use it, make sure you use the dose recommended on the package. Avoid Lomotil . Check with your healthcare provider before you use any medicine for diarrhea.    Don t take ibuprofen, or other nonsteroidal anti-inflammatory medicines without checking with your healthcare provider.  If you were given a prescription for medicine here today, be sure to read all of the information (including the package insert) that comes with your prescription.  This will include important information about the medicine, its side effects, and any warnings that you need to know about.  The pharmacist who fills the prescription can provide more information and answer questions you may have about the medicine.  If you have questions  or concerns that the pharmacist cannot address, please call or return to the Emergency Department.   Opioid Medication Information    Pain medications are among the most commonly prescribed medicines, so we are including this information for all our patients. If you did not receive pain medication or get a prescription for pain medicine, you can ignore it.     You may have been given a prescription for an opioid (narcotic) pain medicine and/or have received a pain medicine while here in the Emergency Department. These medicines can make you drowsy or impaired. You must not drive, operate dangerous equipment, or engage in any other dangerous activities while taking these medications. If you drive while taking these medications, you could be arrested for DUI, or driving under the influence. Do not drink any alcohol while you are taking these medications.     Opioid pain medications can cause addiction. If you have a history of chemical dependency of any type, you are at a higher risk of becoming addicted to pain medications.  Only take these prescribed medications to treat your pain when all other options have been tried. Take it for as short a time and as few doses as possible. Store your pain pills in a secure place, as they are frequently stolen and provide a dangerous opportunity for children or visitors in your house to start abusing these powerful medications. We will not replace any lost or stolen medicine.  As soon as your pain is better, you should flush all your remaining medication.     Many prescription pain medications contain Tylenol  (acetaminophen), including Vicodin , Tylenol #3 , Norco , Lortab , and Percocet .  You should not take any extra pills of Tylenol  if you are using these prescription medications or you can get very sick.  Do not ever take more than 3000 mg of acetaminophen in any 24 hour period.    All opioids tend to cause constipation. Drink plenty of water and eat foods that have a lot  of fiber, such as fruits, vegetables, prune juice, apple juice and high fiber cereal.  Take a laxative if you don t move your bowels at least every other day. Miralax , Milk of Magnesia, Colace , or Senna  can be used to keep you regular.      Remember that you can always come back to the Emergency Department if you are not able to see your regular doctor in the amount of time listed above, if you get any new symptoms, or if there is anything that worries you.        Future Appointments        Provider Department Dept Phone Center    1/16/2018 1:00 PM Gena Bojorquez MD [facility] [phone] [facility]      24 Hour Appointment Hotline       To make an appointment at any [facility], call [phone] ([phone]). If you don't have a family doctor or clinic, we will help you find one. [facility] are conveniently located to serve the needs of you and your family.             Review of your medicines      START taking        Dose / Directions Last dose taken    loperamide 2 MG tablet   Commonly known as:  IMODIUM A-D   Dose:  2 mg   Quantity:  20 tablet        Take 1 tablet (2 mg) by mouth 4 times daily as needed for diarrhea   Refills:  0        ondansetron 4 MG ODT tab   Commonly known as:  ZOFRAN ODT   Dose:  4 mg   Quantity:  10 tablet        Take 1 tablet (4 mg) by mouth every 6 hours as needed for nausea   Refills:  0          Our records show that you are taking the medicines listed below. If these are incorrect, please call your family doctor or clinic.        Dose / Directions Last dose taken    ARMODAFINIL PO   Dose:  250 mg        Take 250 mg by mouth every morning   Refills:  0        ferrous sulfate 325 (65 FE) MG tablet   Commonly known as:  IRON   Dose:  325 mg        Take 325 mg by mouth daily (with breakfast)   Refills:  0        furosemide 20 MG tablet   Commonly known as:  LASIX   Dose:  20 mg        Take 20 mg by mouth daily.   Refills:  0         GABAPENTIN PO   Dose:  800 mg        Take 800 mg by mouth 4 times daily.   Refills:  0        HYDROcodone-acetaminophen 5-325 MG per tablet   Commonly known as:  NORCO   Dose:  2 tablet        Take 2 tablets by mouth daily   Refills:  0        hydroxychloroquine 200 MG tablet   Commonly known as:  PLAQUENIL   Dose:  200 mg        Take 200 mg by mouth 2 times daily.   Refills:  0        INDERAL  MG 24 hr capsule   Dose:  120 mg   Indication:  Migraine Headache   Generic drug:  propranolol        Take 120 mg by mouth daily   Refills:  0        * levothyroxine 25 MCG tablet   Commonly known as:  SYNTHROID/LEVOTHROID   Dose:  1 tablet        Take 1 tablet by mouth daily.   Refills:  0        * LEVOTHROID PO   Dose:  200 mcg        Take 200 mcg by mouth   Refills:  0        loratadine 10 MG tablet   Commonly known as:  CLARITIN   Dose:  10 mg        Take 10 mg by mouth daily   Refills:  0        meloxicam 15 MG tablet   Commonly known as:  MOBIC   Dose:  15 mg        Take 15 mg by mouth daily.   Refills:  0        MICROGESTIN 1.5-30 MG-MCG per tablet   Dose:  1 tablet   Generic drug:  norethindrone-ethinyl estradiol        Take 1 tablet by mouth daily.   Refills:  0        nortriptyline 50 MG capsule   Commonly known as:  PAMELOR   Dose:  100 mg        Take 100 mg by mouth At Bedtime   Refills:  0        oxybutynin 5 MG 24 hr tablet   Commonly known as:  DITROPAN-XL        Take by mouth 2 times daily   Refills:  0        pantoprazole 40 MG EC tablet   Commonly known as:  PROTONIX   Dose:  40 mg        Take 40 mg by mouth daily   Refills:  0        phentermine 37.5 MG tablet   Commonly known as:  ADIPEX-P   Dose:  37.5 mg   Quantity:  30 tablet        Take 1 tablet by mouth every morning (before breakfast).   Refills:  0        PROCHLORPERAZINE RE        Place  rectally as needed.   Refills:  0        PROZAC PO   Dose:  40 mg        Take 40 mg by mouth daily   Refills:  0        RELPAX 40 MG tablet   Dose:  40 mg    Generic drug:  eletriptan        Take 40 mg by mouth at onset of headache.   Refills:  0        * rOPINIRole 0.5 MG tablet   Commonly known as:  REQUIP        Take  by mouth 2 times daily.   Refills:  0        * rOPINIRole 2 MG tablet   Commonly known as:  REQUIP   Dose:  2 mg        Take 2 mg by mouth At Bedtime.   Refills:  0        ZONISAMIDE PO        Refills:  0        * Notice:  This list has 4 medication(s) that are the same as other medications prescribed for you. Read the directions carefully, and ask your doctor or other care provider to review them with you.            Prescriptions were sent or printed at these locations (2 Prescriptions)                   Other Prescriptions                Printed at Department/Unit printer (2 of 2)         ondansetron (ZOFRAN ODT) 4 MG ODT tab               loperamide (IMODIUM A-D) 2 MG tablet                Procedures and tests performed during your visit     Basic metabolic panel    CBC with platelets differential      Orders Needing Specimen Collection     None      Pending Results     No orders found from 1/3/2018 to 1/6/2018.            Pending Culture Results     No orders found from 1/3/2018 to 1/6/2018.            Pending Results Instructions     If you had any lab results that were not finalized at the time of your Discharge, you can call the ED Lab Result RN at [phone]. You will be contacted by this team for any positive Lab results or changes in treatment. The nurses are available 7 days a week from 10A to 6:30P.  You can leave a message 24 hours per day and they will return your call.        Test Results From Your Hospital Stay        1/5/2018 10:36 PM      Component Results     Component Value Ref Range & Units Status    WBC 8.2 4.0 - 11.0 10e9/L Final    RBC Count 4.62 3.8 - 5.2 10e12/L Final    Hemoglobin 13.8 11.7 - 15.7 g/dL Final    Hematocrit 39.0 35.0 - 47.0 % Final    MCV 84 78 - 100 fl Final    MCH 29.9 26.5 - 33.0 pg Final    MCHC 35.4  31.5 - 36.5 g/dL Final    RDW 12.8 10.0 - 15.0 % Final    Platelet Count 218 150 - 450 10e9/L Final    Diff Method Automated Method  Final    % Neutrophils 70.7 % Final    % Lymphocytes 20.5 % Final    % Monocytes 7.2 % Final    % Eosinophils 1.0 % Final    % Basophils 0.2 % Final    % Immature Granulocytes 0.4 % Final    Nucleated RBCs 0 0 /100 Final    Absolute Neutrophil 5.8 1.6 - 8.3 10e9/L Final    Absolute Lymphocytes 1.7 0.8 - 5.3 10e9/L Final    Absolute Monocytes 0.6 0.0 - 1.3 10e9/L Final    Absolute Eosinophils 0.1 0.0 - 0.7 10e9/L Final    Absolute Basophils 0.0 0.0 - 0.2 10e9/L Final    Abs Immature Granulocytes 0.0 0 - 0.4 10e9/L Final    Absolute Nucleated RBC 0.0  Final         1/5/2018  8:34 PM      Component Results     Component Value Ref Range & Units Status    Sodium 141 133 - 144 mmol/L Final    Potassium 3.8 3.4 - 5.3 mmol/L Final    Chloride 106 94 - 109 mmol/L Final    Carbon Dioxide 30 20 - 32 mmol/L Final    Anion Gap 5 3 - 14 mmol/L Final    Glucose 88 70 - 99 mg/dL Final    Urea Nitrogen 13 7 - 30 mg/dL Final    Creatinine 0.55 0.52 - 1.04 mg/dL Final    GFR Estimate >90 >60 mL/min/1.7m2 Final    Non  GFR Calc    GFR Estimate If Black >90 >60 mL/min/1.7m2 Final    African American GFR Calc    Calcium 8.8 8.5 - 10.1 mg/dL Final                Clinical Quality Measure: Blood Pressure Screening     Your blood pressure was checked while you were in the emergency department today. The last reading we obtained was  BP: 131/90 . Please read the guidelines below about what these numbers mean and what you should do about them.  If your systolic blood pressure (the top number) is less than 120 and your diastolic blood pressure (the bottom number) is less than 80, then your blood pressure is normal. There is nothing more that you need to do about it.  If your systolic blood pressure (the top number) is 120-139 or your diastolic blood pressure (the bottom number) is 80-89, your blood  "pressure may be higher than it should be. You should have your blood pressure rechecked within a year by a primary care provider.  If your systolic blood pressure (the top number) is 140 or greater or your diastolic blood pressure (the bottom number) is 90 or greater, you may have high blood pressure. High blood pressure is treatable, but if left untreated over time it can put you at risk for heart attack, stroke, or kidney failure. You should have your blood pressure rechecked by a primary care provider within the next 4 weeks.  If your provider in the emergency department today gave you specific instructions to follow-up with your doctor or provider even sooner than that, you should follow that instruction and not wait for up to 4 weeks for your follow-up visit.        Thank you for choosing [facility]       Thank you for choosing [facility] for your care. Our goal is always to provide you with excellent care. Hearing back from our patients is one way we can continue to improve our services. Please take a few minutes to complete the written survey that you may receive in the mail after you visit with us. Thank you!        [employer] Information     [employer] lets you send messages to your doctor, view your test results, renew your prescriptions, schedule appointments and more. To sign up, go to [email or web address] . Click on \"Log in\" on the left side of the screen, which will take you to the Welcome page. Then click on \"Sign up Now\" on the right side of the page.     You will be asked to enter the access code listed below, as well as some personal information. Please follow the directions to create your username and password.     Your access code is: [email or web address]  Expires: 3/20/2018 12:16 PM     Your access code will  in 90 days. If you need help or a new code, please call your [facility] clinic or [phone].        Care EveryWhere ID     This is your Care EveryWhere ID. This could be used by other organizations " to access your [facility] medical records  [id number]        Equal Access to Services     THOMPSON SON : Yohannes Vergara, tresa payton, tristan marroquin. So [facility] [phone].    ATENCIÓN: Si habla español, tiene a issa disposición servicios gratuitos de asistencia lingüística. Llame al [phone].    We comply with applicable federal civil rights laws and [place] laws. We do not discriminate on the basis of race, color, national origin, age, disability, sex, sexual orientation, or gender identity.            After Visit Summary       This is your record. Keep this with you and show to your community pharmacist(s) and doctor(s) at your next visit.

## 2018-01-05 NOTE — ED AVS SNAPSHOT
Emergency Department    [address] [place] [address]    Phone:  [phone]    Fax:  [phone]                                       Shannan Clifford   MRN: [id number]    Department:   Emergency Department   Date of Visit:  1/5/2018           After Visit Summary Signature Page     I have received my discharge instructions, and my questions have been answered. I have discussed any challenges I see with this plan with the nurse or doctor.    ..........................................................................................................................................  Patient/Patient Representative Signature      ..........................................................................................................................................  Patient Representative Print Name and Relationship to Patient    ..................................................               ................................................  Date                                            Time    ..........................................................................................................................................  Reviewed by Signature/Title    ...................................................              ..............................................  Date                                                            Time

## 2018-01-06 NOTE — ED PROVIDER NOTES
History     Chief Complaint:  Nausea, Vomiting, & Diarrhea    HPI:   Shannan Clifford is a 52 year old female with a history of gallstones, fatty liver disease, among others, who presents with nausea, vomiting, and diarrhea. Pt states she was recently placed on keflex for UTI, and over the last few day shas been feeling better. Today, pt ate fast food, and shortly there after developed vomiting and diarrhea. Pt staets she was getting out of her car, and vomited but then had explosive diarrhea on her pants. PT could not go anywhere and presents to the ER for evaluation. Pt has had troubles with low back pain, and has been concerned about her kidneys. Pt denies sick contacts or recent travel, no hematemeis, or bloody or black stool.       Allergies:  Topiramate  Wellbutrin [Bupropion Hydrobromide]      Medications:    Claritin  Armodafinil  Iron  Oxybutynin  Norco  Zonisamide  Prozac  Phentermine  Levothyroxine  Propranolol  Gabapentin  Protonix  Mobic  Plaquenil  Requip  Pamelor  Microgestin  Prochlorperazine  Relpax  Lasix     Past Medical History:    Anemia  Fatty liver disease  Fibromyalgia  Hypothyroidism  Migraines  KENDAL  Osteoarthrosis  Restless leg syndrome  Vertigo  Gallstones  Esophageal reflux  Morbid obesity  Depression    Past Surgical History:    Deviated septum repair  Myringotomy    Family History:    Obesity- Father  Hypertension- Father  CABG- Father  Hyperlipidemia- Father  Ovarian Cancer- Mother    Social History:  Smoking status: Never Smoker  Alcohol use: Yes -- Rarely   Marital Status:  Single      Review of Systems   Constitutional: Negative for fever.   Gastrointestinal: Positive for diarrhea, nausea and vomiting.   All other systems reviewed and are negative.      Physical Exam     Patient Vitals for the past 24 hrs:   BP Temp Temp src Pulse Resp SpO2 Height Weight   01/05/18 2210 131/90 - - 84 16 97 % - -   01/05/18 2046 129/67 - - 94 16 97 % - -   01/05/18 1956 130/59 98  F (36.7  C)  "Temporal 83 14 99 % 1.626 m (5' 4\") (!) 158.8 kg (350 lb)      Physical Exam   Constitutional: She is oriented to person, place, and time. She appears well-developed.   Morbidly obese well-appearing.   HENT:   Head: Normocephalic and atraumatic.   Right Ear: External ear normal.   Mouth/Throat: Oropharynx is clear and moist.   Eyes: Conjunctivae and EOM are normal. Pupils are equal, round, and reactive to light.   Neck: Normal range of motion. Neck supple. No JVD present.   Cardiovascular: Normal rate, regular rhythm and normal heart sounds.    Pulmonary/Chest: Effort normal and breath sounds normal.   Abdominal: Soft. She exhibits no distension. Bowel sounds are increased. There is generalized tenderness. There is no rebound.   Musculoskeletal: Normal range of motion.   Lymphadenopathy:     She has no cervical adenopathy.   Neurological: She is alert and oriented to person, place, and time. She displays normal reflexes. No cranial nerve deficit. She exhibits normal muscle tone. Coordination normal.   Skin: Skin is warm and dry.   Psychiatric: She has a normal mood and affect. Her behavior is normal. Judgment normal.   Nursing note and vitals reviewed.  :    Emergency Department Course     Laboratory:  CBC: WNL (WBC 8.2, HGB 13.8, )    Basic Metabolic Panel: WNL (Creatinine 0.55)     Interventions:  2044- Zofran-ODT 4 mg PO    Emergency Department Course:  Past medical records, nursing notes, and vitals reviewed.  2023: I performed an exam of the patient and obtained history, as documented above. GCS 15.     IV inserted and blood drawn.     The above intervention was administered.     2208: I rechecked the patient. Explained findings to the patient.     2247: I rechecked the patient. Laboratory findings and plan explained to the Patient. Patient discharged home with instructions regarding supportive care, medications, and reasons to return. The importance of close follow-up was reviewed.      Impression & " Plan      Medical Decision Making:  Shannan Clifford is a 52 year old female who presents to the emergency department for evaluation of nausea, vomiting, and diarrhea. The patient is well-appearing and there is no clinical concern for dehydration. She recently completed a course of antibiotics for a UTI. Here, she was provided Zofran with significant improvement of her symptoms. After her clothes were cleaned, she was discharged home in improved condition.     Diagnosis:    ICD-10-CM    1. Gastroenteritis K52.9      Disposition:  Discharged to home with Imodium and Zofran.     Discharge Medications:  New Prescriptions    LOPERAMIDE (IMODIUM A-D) 2 MG TABLET    Take 1 tablet (2 mg) by mouth 4 times daily as needed for diarrhea    ONDANSETRON (ZOFRAN ODT) 4 MG ODT TAB    Take 1 tablet (4 mg) by mouth every 6 hours as needed for nausea     Demetria Blanchard  1/5/2018    EMERGENCY DEPARTMENT    IDemetria, am serving as a scribe at 8:23 PM on 1/5/2018 to document services personally performed by Tony Vasquez MD based on my observations and the provider's statements to me.       Tony Vasquez MD  01/07/18 8151

## 2023-07-28 RX ORDER — MIRABEGRON 50 MG/1
TABLET, FILM COATED, EXTENDED RELEASE ORAL
Qty: 90 TABLET | OUTPATIENT
Start: 2023-07-28

## 2023-07-28 NOTE — TELEPHONE ENCOUNTER
Pending Prescriptions:                       Disp   Refills    MYRBETRIQ 50 MG 24 hr tablet [Pharmacy Me*90 tab*             Sig: TAKE 1 TABLET BY MOUTH DAILY    Patient is not an [facility] patient.  Medication refused.

## 2024-08-30 ENCOUNTER — THERAPY VISIT (OUTPATIENT)
Dept: PHYSICAL THERAPY | Facility: REHABILITATION | Age: 59
End: 2024-08-30
Payer: MEDICARE

## 2024-08-30 DIAGNOSIS — G89.29 CHRONIC BILATERAL LOW BACK PAIN WITHOUT SCIATICA: ICD-10-CM

## 2024-08-30 DIAGNOSIS — M54.50 CHRONIC BILATERAL LOW BACK PAIN WITHOUT SCIATICA: ICD-10-CM

## 2024-08-30 DIAGNOSIS — M54.2 CERVICAL PAIN: ICD-10-CM

## 2024-08-30 DIAGNOSIS — M79.18 MYOFASCIAL PAIN: Primary | ICD-10-CM

## 2024-08-30 PROCEDURE — 97163 PT EVAL HIGH COMPLEX 45 MIN: CPT | Mod: KX | Performed by: PHYSICAL THERAPIST

## 2024-08-30 PROCEDURE — 97140 MANUAL THERAPY 1/> REGIONS: CPT | Mod: KX | Performed by: PHYSICAL THERAPIST

## 2024-08-30 NOTE — PROGRESS NOTES
PHYSICAL THERAPY EVALUATION  Type of Visit: Evaluation       Fall Risk Screen:  Fall screen completed by: PT  Have you fallen 2 or more times in the past year?: No  Have you fallen and had an injury in the past year?: Yes  Is patient a fall risk?: Yes  Fall screen comments: unable to complete TUG    Subjective       Presenting condition or subjective complaint: problems stemming from neck: pain, lightheaded, brain fog and recall issues, neurological issues, etc.          Also very bad knees with arthritis and bone on bone that give out randomly. Many of these issues started as a child. She does have a lot of pain but does get quite a bit of brain fog. She does also have a functional movement disorder - restless legs, periodic limb movements. Sleeping helps with many of these things. She can wake from her symptoms. She does feel like her legs are weak from all of her falls. She is OA in her knees which is bone on bone. She does have to use her walker to move around all the time. Turning her neck can make her lightheaded and can even start to slur words and can get sensitive to light/sound. She does get HA's regularly. She gets migraines 2-3 in a week and then may not have them for a week. These used to be everyday prior to menopause.  Her shoulders and neck are just really sore. Her knees are really painful. She does find that at times her body will freeze which does come from pain in her base of her skull. She does get some N/T in her hands and into her R knee/foot.   Date of onset: 08/21/24    Relevant medical history: Anemia; Arthritis; Bladder or bowel problems; Chest pain; Concussions; Depression; Dizziness; Fibromyalgia; Foot drop; Hearing problems; Incontinence; Menopause; Migraines or headaches; Neck injury; Non-healing wounds; Osteoarthritis; Overweight; Pain at night or rest; Progressive neurological deficits; Severe dizziness; Severe headaches; Significant weakness; Sleep disorder like apnea; Thyroid  problems; Vision problems Lupus  Dates & types of surgery: deviated septum 2015,  tubes in ears twice 1970's,  urethra - stretch opening 1973    Prior diagnostic imaging/testing results: MRI; CT scan; X-ray     Prior therapy history for the same diagnosis, illness or injury: Yes psychical therapy (throughout the years), chiropractic (on and off 15 years), counterstrain(2022-24), cortisone shots ( 2014- 18)    Prior Level of Function  Transfers:   Ambulation:   ADL:   IADL:     Living Environment  Social support: Alone   Type of home: House; 1 level   Stairs to enter the home: No       Ramp: Yes   Stairs inside the home: No       Help at home: Self Cares (home health aide/personal care attendant, family, etc); Home management tasks (cooking, cleaning); Medication and/or finances; Home and Yard maintenance tasks; Assist for driving and community activities; Emergency call system  Equipment owned: Walker with wheels     Employment: No    Hobbies/Interests: various home decorating and crafts    Patient goals for therapy: to stand and walk without worrying about falling down.   Able to move around and do everyday activities without pain that can increase so I have to sit out from normal activities, even moving  my neck alot can wear me out due to neck issues.    Pain assessment:      Objective   CERVICAL SPINE EVALUATION  PAIN: Pain Level at Rest: 1/10  Pain Level with Use: 9/10  Pain Location: neck/shoulders/knees  Pain Quality: Aching, Dull, Sharp, Shooting, and Tingling  INTEGUMENTARY (edema, incisions):   POSTURE:   GAIT:   Assistive Device(s): Walker (platform)  Gait Deviations:  very slow gait pattern with short strides  BALANCE/PROPRIOCEPTION:   WEIGHTBEARING ALIGNMENT:   ROM:   (Degrees) Left AROM Right AROM    Cervical Flexion 42 does get lightheaded    Cervical Extension 54 does get lightheaded    Cervical Side bend      Cervical Rotation 72 65    Cervical Protrusion     Cervical Retraction     Thoracic Flexion      Thoracic Extension     Thoracic Rotation 47 35     Left AROM Left PROM Right AROM Right PROM   Shoulder Flexion WFL  WFL    Shoulder Extension       Shoulder Abduction WFL  WFL    Shoulder Adduction       Shoulder IR       Shoulder ER       Shoulder Horiz Abduction       Shoulder Horiz Adduction       Pain:   End Feel:     MYOTOMES: MMT's were not tested today due to possible irritability. Her LE's are limited by soft tissue approximation as well.     DTR S:   CORD SIGNS:   DERMATOMES:   NEURAL TENSION:   FLEXIBILITY:    SPECIAL TESTS:   PALPATION: there are fascial hypomobilities present in her cranium and along her spine. There is increased tone in her paraspinals throughout. She does have a large Dowager's hump in her CT junction.   SPINAL SEGMENTAL CONCLUSIONS:       Assessment & Plan   CLINICAL IMPRESSIONS  Medical Diagnosis: neck, migraines, fibromyalgia    Treatment Diagnosis: myofascial pain   Impression/Assessment: Patient is a 58 year old female with chronic pain/fibromyalgia complaints.  The following significant findings have been identified: Pain, Decreased ROM/flexibility, Decreased joint mobility, Decreased strength, Impaired balance, Impaired gait, Impaired muscle performance, and Decreased activity tolerance. These impairments interfere with their ability to perform self care tasks, household chores, household mobility, and community mobility as compared to previous level of function.     Clinical Decision Making (Complexity):  Clinical Presentation: Unstable/Unpredictable   Clinical Presentation Rationale: based on medical and personal factors listed in PT evaluation  Clinical Decision Making (Complexity): High complexity    PLAN OF CARE  Treatment Interventions:  Interventions: Manual Therapy, Neuromuscular Re-education, Therapeutic Activity, Therapeutic Exercise    Long Term Goals     PT Goal 1  Goal Identifier: 1  Goal Description: Pt will decrease HA/migraine frequency to <2x/month to  improve daily function in 90 days  Rationale: to maximize safety and independence with performance of ADLs and functional tasks  PT Goal 2  Goal Identifier: 2  Goal Description: Pt will improve her C-rot to >75 deg B and T-rot to >50 deg B to improve spinal motion and improve ADL function in 90 days  Rationale: to maximize safety and independence with performance of ADLs and functional tasks      Frequency of Treatment: 1x/week  Duration of Treatment: 90 days    Recommended Referrals to Other Professionals:   Education Assessment:        Risks and benefits of evaluation/treatment have been explained.   Patient/Family/caregiver agrees with Plan of Care.     Evaluation Time:     PT Eval, High Complexity Minutes (58036): 30       Signing Clinician: BRYCE LOMBARDO, PT        [facility]                                                                                   OUTPATIENT PHYSICAL THERAPY      PLAN OF TREATMENT FOR OUTPATIENT REHABILITATION   Patient's Last Name, First Name, Shannan Simon YOB: 1965   Provider's Name   [facility]   Medical Record No.  [id number]     Onset Date: 08/21/24  Start of Care Date: 08/30/24     Medical Diagnosis:  neck, migraines, fibromyalgia      PT Treatment Diagnosis:  myofascial pain Plan of Treatment  Frequency/Duration: 1x/week/ 90 days    Certification date from 08/30/24 to 11/28/24         See note for plan of treatment details and functional goals     BRYCE LOMBARDO, PT                         I CERTIFY THE NEED FOR THESE SERVICES FURNISHED UNDER        THIS PLAN OF TREATMENT AND WHILE UNDER MY CARE .             Physician Signature               Date    X_____________________________________________________                  Referring Provider:  Referred Self    Initial Assessment  See Epic Evaluation- Start of Care Date: 08/30/24

## 2024-09-16 ENCOUNTER — THERAPY VISIT (OUTPATIENT)
Dept: PHYSICAL THERAPY | Facility: REHABILITATION | Age: 59
End: 2024-09-16
Payer: MEDICARE

## 2024-09-16 DIAGNOSIS — M54.50 CHRONIC BILATERAL LOW BACK PAIN WITHOUT SCIATICA: ICD-10-CM

## 2024-09-16 DIAGNOSIS — M79.18 MYOFASCIAL PAIN: Primary | ICD-10-CM

## 2024-09-16 DIAGNOSIS — G89.29 CHRONIC BILATERAL LOW BACK PAIN WITHOUT SCIATICA: ICD-10-CM

## 2024-09-16 DIAGNOSIS — M54.2 CERVICAL PAIN: ICD-10-CM

## 2024-09-16 PROCEDURE — 97140 MANUAL THERAPY 1/> REGIONS: CPT | Mod: KX | Performed by: PHYSICAL THERAPIST

## 2024-10-03 ENCOUNTER — THERAPY VISIT (OUTPATIENT)
Dept: PHYSICAL THERAPY | Facility: REHABILITATION | Age: 59
End: 2024-10-03
Payer: MEDICARE

## 2024-10-03 DIAGNOSIS — M54.2 CERVICAL PAIN: ICD-10-CM

## 2024-10-03 DIAGNOSIS — M54.50 CHRONIC BILATERAL LOW BACK PAIN WITHOUT SCIATICA: ICD-10-CM

## 2024-10-03 DIAGNOSIS — M79.18 MYOFASCIAL PAIN: Primary | ICD-10-CM

## 2024-10-03 DIAGNOSIS — G89.29 CHRONIC BILATERAL LOW BACK PAIN WITHOUT SCIATICA: ICD-10-CM

## 2024-10-03 PROCEDURE — 97140 MANUAL THERAPY 1/> REGIONS: CPT | Mod: KX | Performed by: PHYSICAL THERAPIST

## 2024-10-29 ENCOUNTER — THERAPY VISIT (OUTPATIENT)
Dept: PHYSICAL THERAPY | Facility: REHABILITATION | Age: 59
End: 2024-10-29
Payer: MEDICARE

## 2024-10-29 DIAGNOSIS — M54.2 CERVICAL PAIN: ICD-10-CM

## 2024-10-29 DIAGNOSIS — M54.50 CHRONIC BILATERAL LOW BACK PAIN WITHOUT SCIATICA: ICD-10-CM

## 2024-10-29 DIAGNOSIS — M79.18 MYOFASCIAL PAIN: Primary | ICD-10-CM

## 2024-10-29 DIAGNOSIS — G89.29 CHRONIC BILATERAL LOW BACK PAIN WITHOUT SCIATICA: ICD-10-CM

## 2024-10-29 PROCEDURE — 97140 MANUAL THERAPY 1/> REGIONS: CPT | Mod: KX | Performed by: PHYSICAL THERAPIST

## 2024-11-05 ENCOUNTER — THERAPY VISIT (OUTPATIENT)
Dept: PHYSICAL THERAPY | Facility: REHABILITATION | Age: 59
End: 2024-11-05
Payer: MEDICARE

## 2024-11-05 DIAGNOSIS — M79.18 MYOFASCIAL PAIN: Primary | ICD-10-CM

## 2024-11-05 DIAGNOSIS — M54.2 CERVICAL PAIN: ICD-10-CM

## 2024-11-05 DIAGNOSIS — G89.29 CHRONIC BILATERAL LOW BACK PAIN WITHOUT SCIATICA: ICD-10-CM

## 2024-11-05 DIAGNOSIS — M54.50 CHRONIC BILATERAL LOW BACK PAIN WITHOUT SCIATICA: ICD-10-CM

## 2024-11-05 PROCEDURE — 97140 MANUAL THERAPY 1/> REGIONS: CPT | Mod: KX | Performed by: PHYSICAL THERAPIST

## 2024-11-12 ENCOUNTER — THERAPY VISIT (OUTPATIENT)
Dept: PHYSICAL THERAPY | Facility: REHABILITATION | Age: 59
End: 2024-11-12
Payer: MEDICARE

## 2024-11-12 DIAGNOSIS — G89.29 CHRONIC BILATERAL LOW BACK PAIN WITHOUT SCIATICA: ICD-10-CM

## 2024-11-12 DIAGNOSIS — M79.18 MYOFASCIAL PAIN: Primary | ICD-10-CM

## 2024-11-12 DIAGNOSIS — M54.50 CHRONIC BILATERAL LOW BACK PAIN WITHOUT SCIATICA: ICD-10-CM

## 2024-11-12 DIAGNOSIS — M54.2 CERVICAL PAIN: ICD-10-CM

## 2024-11-12 PROCEDURE — 97140 MANUAL THERAPY 1/> REGIONS: CPT | Mod: KX | Performed by: PHYSICAL THERAPIST

## 2024-11-19 ENCOUNTER — THERAPY VISIT (OUTPATIENT)
Dept: PHYSICAL THERAPY | Facility: REHABILITATION | Age: 59
End: 2024-11-19
Payer: MEDICARE

## 2024-11-19 DIAGNOSIS — G89.29 CHRONIC BILATERAL LOW BACK PAIN WITHOUT SCIATICA: ICD-10-CM

## 2024-11-19 DIAGNOSIS — M54.2 CERVICAL PAIN: ICD-10-CM

## 2024-11-19 DIAGNOSIS — M54.50 CHRONIC BILATERAL LOW BACK PAIN WITHOUT SCIATICA: ICD-10-CM

## 2024-11-19 DIAGNOSIS — M79.18 MYOFASCIAL PAIN: Primary | ICD-10-CM

## 2024-11-26 ENCOUNTER — THERAPY VISIT (OUTPATIENT)
Dept: PHYSICAL THERAPY | Facility: REHABILITATION | Age: 59
End: 2024-11-26
Payer: MEDICARE

## 2024-11-26 DIAGNOSIS — M54.50 CHRONIC BILATERAL LOW BACK PAIN WITHOUT SCIATICA: ICD-10-CM

## 2024-11-26 DIAGNOSIS — M54.2 CERVICAL PAIN: ICD-10-CM

## 2024-11-26 DIAGNOSIS — M79.18 MYOFASCIAL PAIN: Primary | ICD-10-CM

## 2024-11-26 DIAGNOSIS — G89.29 CHRONIC BILATERAL LOW BACK PAIN WITHOUT SCIATICA: ICD-10-CM

## 2024-11-26 PROCEDURE — 97140 MANUAL THERAPY 1/> REGIONS: CPT | Mod: KX | Performed by: PHYSICAL THERAPIST

## 2024-12-12 ENCOUNTER — THERAPY VISIT (OUTPATIENT)
Dept: PHYSICAL THERAPY | Facility: REHABILITATION | Age: 59
End: 2024-12-12
Payer: MEDICARE

## 2024-12-12 DIAGNOSIS — G89.29 CHRONIC BILATERAL LOW BACK PAIN WITHOUT SCIATICA: ICD-10-CM

## 2024-12-12 DIAGNOSIS — M54.50 CHRONIC BILATERAL LOW BACK PAIN WITHOUT SCIATICA: ICD-10-CM

## 2024-12-12 DIAGNOSIS — M79.18 MYOFASCIAL PAIN: Primary | ICD-10-CM

## 2024-12-12 DIAGNOSIS — M54.2 CERVICAL PAIN: ICD-10-CM

## 2024-12-12 NOTE — PROGRESS NOTES
[facility]                                                                                   OUTPATIENT PHYSICAL THERAPY    PLAN OF TREATMENT FOR OUTPATIENT REHABILITATION   Patient's Last Name, First Name, Shannan Simon YOB: 1965   Provider's Name   [facility]   Medical Record No.  [id number]     Onset Date: (P) 08/21/24  Start of Care Date: (P) 08/30/24     Medical Diagnosis:  (P) neck, migraines, fibromyalgia      PT Treatment Diagnosis:  (P) myofascial pain Plan of Treatment  Frequency/Duration: (P) 1x/week/ (P) 90 days    Certification date from (P) 11/28/24 to (P) 02/28/25         See note for plan of treatment details and functional goals     SERGIO LOMBARDO, PT                         I CERTIFY THE NEED FOR THESE SERVICES FURNISHED UNDER        THIS PLAN OF TREATMENT AND WHILE UNDER MY CARE .             Physician Signature               Date    X_____________________________________________________                  Referring Provider:  Scarlet Hernandez    Initial Assessment  See Epic Evaluation- Start of Care Date: (P) 08/30/24            PLAN  Continue therapy per current plan of care.    Beginning/End Dates of Progress Note Reporting Period:  (P) 08/30/24 to 12/12/2024    Referring Provider:  Scarlet Hernandez         12/12/24 0500   Appointment Info   Signing clinician's name / credentials Sergio Lombardo PT   Visits Used 9   Medical Diagnosis neck, migraines, fibromyalgia   PT Tx Diagnosis myofascial pain   Progress Note/Certification   Start of Care Date 08/30/24   Onset of illness/injury or Date of Surgery 08/21/24   Therapy Frequency 1x/week   Predicted Duration 90 days   Certification date from 11/28/24   Certification date to 02/28/25   Progress Note Completed Date 08/30/24   GOALS   PT Goals 2   PT Goal 1   Goal Identifier 1   Goal Description Pt will decrease HA/migraine frequency to <2x/month to  improve daily function in 90 days   Rationale to maximize safety and independence with performance of ADLs and functional tasks   Goal Progress there have been less migraines   PT Goal 2   Goal Identifier 2   Goal Description Pt will improve her C-rot to >75 deg B and T-rot to >50 deg B to improve spinal motion and improve ADL function in 90 days   Rationale to maximize safety and independence with performance of ADLs and functional tasks   Goal Progress C-rot: 70/73   Subjective Report   Subjective Report Things have been a little up and down but overall things have been doing better. The back had been hurting with her ribs and was able to breath through it for hte most part. She did a lot of unwinding last night which really helped. She has been having less migraines. She does notice that she is thinking better on more days. The knees have been much better. She is also getting her wraps/massage on her legs more regularly which does help her as well. The R forearm is better and the upper arm isn't hurting nearly as bad.   Objective Measures   Objective Measures Objective Measure 1   Objective Measure 1   Objective Measure C-rot: 71/75   Treatment Interventions (PT)   Interventions Manual Therapy   Manual Therapy   Manual Therapy: Mobilization, MFR, MLD, friction massage minutes (35196) 55   Skilled Intervention Fascial release using Strain-Counterstrain with mobilizations to B cranial dura-N, B mid thoracic F (P)-MS, B middle rib I (P)-MS,B cervical/upper thoracic-A   Patient Response/Progress Pt in sitting position during treatment session in larger chair   Plan   Plan for next session Plan to con't with manual therapy to decrease fascial tension/tone to normalize ROM/decrease inflammation/decrease mm tone and improve proprioception.   Comments   Comments She did have good release of fascial tensions today with treatment. Overall she does seem to be having less symptoms and is improving on her function. There is  also improvement in her ROM. She does remain appropriate for skilled PT to reach all stated goals.

## 2025-01-07 ENCOUNTER — THERAPY VISIT (OUTPATIENT)
Dept: PHYSICAL THERAPY | Facility: REHABILITATION | Age: 60
End: 2025-01-07
Payer: MEDICARE

## 2025-01-07 DIAGNOSIS — G89.29 CHRONIC BILATERAL LOW BACK PAIN WITHOUT SCIATICA: ICD-10-CM

## 2025-01-07 DIAGNOSIS — M79.18 MYOFASCIAL PAIN: Primary | ICD-10-CM

## 2025-01-07 DIAGNOSIS — M54.50 CHRONIC BILATERAL LOW BACK PAIN WITHOUT SCIATICA: ICD-10-CM

## 2025-01-07 DIAGNOSIS — M54.2 CERVICAL PAIN: ICD-10-CM

## 2025-01-07 PROCEDURE — 97140 MANUAL THERAPY 1/> REGIONS: CPT | Mod: GP | Performed by: PHYSICAL THERAPIST

## 2025-01-23 ENCOUNTER — THERAPY VISIT (OUTPATIENT)
Dept: PHYSICAL THERAPY | Facility: REHABILITATION | Age: 60
End: 2025-01-23
Payer: MEDICARE

## 2025-01-23 DIAGNOSIS — M54.50 CHRONIC BILATERAL LOW BACK PAIN WITHOUT SCIATICA: ICD-10-CM

## 2025-01-23 DIAGNOSIS — G89.29 CHRONIC BILATERAL LOW BACK PAIN WITHOUT SCIATICA: ICD-10-CM

## 2025-01-23 DIAGNOSIS — M79.18 MYOFASCIAL PAIN: Primary | ICD-10-CM

## 2025-01-23 DIAGNOSIS — M54.2 CERVICAL PAIN: ICD-10-CM

## 2025-02-05 ENCOUNTER — THERAPY VISIT (OUTPATIENT)
Dept: PHYSICAL THERAPY | Facility: REHABILITATION | Age: 60
End: 2025-02-05
Payer: MEDICARE

## 2025-02-05 DIAGNOSIS — M54.50 CHRONIC BILATERAL LOW BACK PAIN WITHOUT SCIATICA: ICD-10-CM

## 2025-02-05 DIAGNOSIS — G89.29 CHRONIC BILATERAL LOW BACK PAIN WITHOUT SCIATICA: ICD-10-CM

## 2025-02-05 DIAGNOSIS — M79.18 MYOFASCIAL PAIN: Primary | ICD-10-CM

## 2025-02-05 DIAGNOSIS — M54.2 CERVICAL PAIN: ICD-10-CM

## 2025-02-05 PROCEDURE — 97140 MANUAL THERAPY 1/> REGIONS: CPT | Mod: GP | Performed by: PHYSICAL THERAPIST

## 2025-03-10 ENCOUNTER — THERAPY VISIT (OUTPATIENT)
Dept: PHYSICAL THERAPY | Facility: REHABILITATION | Age: 60
End: 2025-03-10
Payer: MEDICARE

## 2025-03-10 DIAGNOSIS — M79.18 MYOFASCIAL PAIN: Primary | ICD-10-CM

## 2025-03-10 DIAGNOSIS — M54.50 CHRONIC BILATERAL LOW BACK PAIN WITHOUT SCIATICA: ICD-10-CM

## 2025-03-10 DIAGNOSIS — M54.2 CERVICAL PAIN: ICD-10-CM

## 2025-03-10 DIAGNOSIS — G89.29 CHRONIC BILATERAL LOW BACK PAIN WITHOUT SCIATICA: ICD-10-CM

## 2025-03-10 NOTE — PROGRESS NOTES
[facility]                                                                                   OUTPATIENT PHYSICAL THERAPY    PLAN OF TREATMENT FOR OUTPATIENT REHABILITATION   Patient's Last Name, First Name, Shannan Simon YOB: 1965   Provider's Name   [facility]   Medical Record No.  [id number]     Onset Date: (P) 08/21/24  Start of Care Date: (P) 08/30/24     Medical Diagnosis:  (P) neck, migraines, fibromyalgia      PT Treatment Diagnosis:  (P) myofascial pain Plan of Treatment  Frequency/Duration: (P) 1x/week/ (P) 90 days    Certification date from (P) 02/28/25 to (P) 05/27/25         See note for plan of treatment details and functional goals     SERGIO LOMBARDO, PT                         I CERTIFY THE NEED FOR THESE SERVICES FURNISHED UNDER        THIS PLAN OF TREATMENT AND WHILE UNDER MY CARE .             Physician Signature               Date    X_____________________________________________________                  Referring Provider:  Scarlet Hernandez    Initial Assessment  See Epic Evaluation- Start of Care Date: (P) 08/30/24            PLAN  Continue therapy per current plan of care.    Beginning/End Dates of Progress Note Reporting Period:  (P) 12/12/24 to 03/10/2025    Referring Provider:  Scarlet Hernandez         03/10/25 0500   Appointment Info   Signing clinician's name / credentials Sergio Lombardo PT   Visits Used 13   Medical Diagnosis neck, migraines, fibromyalgia   PT Tx Diagnosis myofascial pain   Progress Note/Certification   Start of Care Date 08/30/24   Onset of illness/injury or Date of Surgery 08/21/24   Therapy Frequency 1x/week   Predicted Duration 90 days   Certification date from 02/28/25   Certification date to 05/27/25   Progress Note Completed Date 12/12/24   GOALS   PT Goals 2   PT Goal 1   Goal Identifier 1   Goal Description Pt will decrease HA/migraine frequency to <2x/month  "to improve daily function in 90 days   Rationale to maximize safety and independence with performance of ADLs and functional tasks   Goal Progress she isn't getting nearly as much pain with her migraines   PT Goal 2   Goal Identifier 2   Goal Description Pt will improve her C-rot to >75 deg B and T-rot to >50 deg B to improve spinal motion and improve ADL function in 90 days   Rationale to maximize safety and independence with performance of ADLs and functional tasks   Goal Progress C-rot: 73/80   Subjective Report   Subjective Report Overall things have been going pretty good. Her legs haven't been the greatest and the knees have been difficult for her. When really having to move she can sometimes get the \"spins\" and lightheadedness  which she thinks may be coming from her neck. She does feel winded frequently. Walking is giving her knees \"deep pain\". She does also notice that her muscles will just give out on her. The knees did give out on her and fell at home. She does need to get her eyes checked with some blurriness/double vision issues at times. The back of the head is where her head pain. She does still get HA's but they are not as bad as they used to be.   Objective Measures   Objective Measures Objective Measure 1   Objective Measure 1   Objective Measure C-rot: 73/80   Treatment Interventions (PT)   Interventions Manual Therapy   Manual Therapy   Manual Therapy: Mobilization, MFR, MLD, friction massage minutes ([id number]) 55   Skilled Intervention Fascial release using Strain-Counterstrain with mobilizations to B C1/FM (P)-MS, B cranial dura-N, B cervical PEPI-N, B cervical F (P)-MS, B upper cervical PLL-MS   Patient Response/Progress Pt in sitting position during treatment session in larger chair   Plan   Plan for next session Plan to con't with manual therapy to decrease fascial tension/tone to normalize ROM/decrease inflammation/decrease mm tone and improve proprioception.   Comments   Comments She did feel " improvement in her neck pain post treatment today. Overall she continues to be better and is having less symptoms. She is gaining towards her goals and does remain appropriate for skilled PT to reach all stated goals.

## 2025-03-24 ENCOUNTER — THERAPY VISIT (OUTPATIENT)
Dept: PHYSICAL THERAPY | Facility: REHABILITATION | Age: 60
End: 2025-03-24
Payer: MEDICARE

## 2025-03-24 DIAGNOSIS — M79.18 MYOFASCIAL PAIN: Primary | ICD-10-CM

## 2025-03-24 DIAGNOSIS — G89.29 CHRONIC BILATERAL LOW BACK PAIN WITHOUT SCIATICA: ICD-10-CM

## 2025-03-24 DIAGNOSIS — M54.2 CERVICAL PAIN: ICD-10-CM

## 2025-03-24 DIAGNOSIS — M54.50 CHRONIC BILATERAL LOW BACK PAIN WITHOUT SCIATICA: ICD-10-CM

## 2025-03-24 PROCEDURE — 97140 MANUAL THERAPY 1/> REGIONS: CPT | Mod: GP | Performed by: PHYSICAL THERAPIST

## 2025-05-14 ENCOUNTER — THERAPY VISIT (OUTPATIENT)
Dept: PHYSICAL THERAPY | Facility: REHABILITATION | Age: 60
End: 2025-05-14
Payer: MEDICARE

## 2025-05-14 DIAGNOSIS — G89.29 CHRONIC BILATERAL LOW BACK PAIN WITHOUT SCIATICA: ICD-10-CM

## 2025-05-14 DIAGNOSIS — M54.2 CERVICAL PAIN: ICD-10-CM

## 2025-05-14 DIAGNOSIS — M54.50 CHRONIC BILATERAL LOW BACK PAIN WITHOUT SCIATICA: ICD-10-CM

## 2025-05-14 DIAGNOSIS — M79.18 MYOFASCIAL PAIN: Primary | ICD-10-CM

## 2025-05-14 PROCEDURE — 97140 MANUAL THERAPY 1/> REGIONS: CPT | Mod: GP | Performed by: PHYSICAL THERAPIST

## 2025-05-29 ENCOUNTER — THERAPY VISIT (OUTPATIENT)
Dept: PHYSICAL THERAPY | Facility: REHABILITATION | Age: 60
End: 2025-05-29
Payer: MEDICARE

## 2025-05-29 DIAGNOSIS — M54.2 CERVICAL PAIN: ICD-10-CM

## 2025-05-29 DIAGNOSIS — G89.29 CHRONIC BILATERAL LOW BACK PAIN WITHOUT SCIATICA: ICD-10-CM

## 2025-05-29 DIAGNOSIS — M54.50 CHRONIC BILATERAL LOW BACK PAIN WITHOUT SCIATICA: ICD-10-CM

## 2025-05-29 DIAGNOSIS — M79.18 MYOFASCIAL PAIN: Primary | ICD-10-CM

## 2025-05-29 NOTE — PROGRESS NOTES
[facility]                                                                                   OUTPATIENT PHYSICAL THERAPY    PLAN OF TREATMENT FOR OUTPATIENT REHABILITATION   Patient's Last Name, First Name, Shannan Simon YOB: 1965   Provider's Name   [facility]   Medical Record No.  [id number]     Onset Date: (P) 08/21/24  Start of Care Date: (P) 08/30/24     Medical Diagnosis:  (P) neck, migraines, fibromyalgia      PT Treatment Diagnosis:  (P) myofascial pain Plan of Treatment  Frequency/Duration: (P) 1x/week/ (P) 90 days    Certification date from (P) 05/27/25 to (P) 08/25/25         See note for plan of treatment details and functional goals     SERGIO LOMBARDO, PT                         I CERTIFY THE NEED FOR THESE SERVICES FURNISHED UNDER        THIS PLAN OF TREATMENT AND WHILE UNDER MY CARE .             Physician Signature               Date    X_____________________________________________________                  Referring Provider:  Scarlet Hernandez    Initial Assessment  See Epic Evaluation- Start of Care Date: (P) 08/30/24            PLAN  Continue therapy per current plan of care.    Beginning/End Dates of Progress Note Reporting Period:  (P) 03/10/25 to 05/29/2025    Referring Provider:  Scarlet Hernandez         05/29/25 0500   Appointment Info   Signing clinician's name / credentials Sergio Lombardo PT   Visits Used 16   Medical Diagnosis neck, migraines, fibromyalgia   PT Tx Diagnosis myofascial pain   Progress Note/Certification   Start of Care Date 08/30/24   Onset of illness/injury or Date of Surgery 08/21/24   Therapy Frequency 1x/week   Predicted Duration 90 days   Certification date from 05/27/25   Certification date to 08/25/25   Progress Note Completed Date 03/10/25   GOALS   PT Goals 2   PT Goal 1   Goal Identifier 1   Goal Description Pt will decrease HA/migraine frequency to <2x/month  to improve daily function in 90 days   Rationale to maximize safety and independence with performance of ADLs and functional tasks   Goal Progress Progressing: only having small HA's 1x/week and not really having the migraines   Target Date 08/27/25   PT Goal 2   Goal Identifier 2   Goal Description Pt will improve her C-rot to >75 deg B and T-rot to >50 deg B to improve spinal motion and improve ADL function in 90 days   Rationale to maximize safety and independence with performance of ADLs and functional tasks   Goal Progress Progressing: C-rot: 71/78   Target Date 08/27/25   Subjective Report   Subjective Report Things seem to be going better. She isn't in as much pain everywhere in her body as she was before. The knees were feeling pretty good after the last time for quite a while but they have been hurting a little more. The headaches/migraines have been a lot better. She is only having very small amounts of these which is great. She is getting the little HA's 1x/week but not the migraines. Her arm has been much better as well as she hasn't really thought about it at all  in quite a while.   Objective Measures   Objective Measures Objective Measure 1   Objective Measure 1   Objective Measure C-rot: 71/78   Treatment Interventions (PT)   Interventions Manual Therapy;Therapeutic Procedure/Exercise   Therapeutic Procedure/Exercise   Ther Proc 1 encouraged to do seated LE exercises while at home   Manual Therapy   Manual Therapy: Mobilization, MFR, MLD, friction massage minutes ([id number]) 55   Skilled Intervention Fascial release using Strain-Counterstrain with mobilizations to BLE-SF, B cervical-A, B sternal-LV, B brachial plexus/SUBCL-N, B cranial dura-N   Patient Response/Progress Pt in sitting position during treatment session in larger chair   Plan   Plan for next session Plan to con't with manual therapy to decrease fascial tension/tone to normalize ROM/decrease inflammation/decrease mm tone and improve  proprioception.   Comments   Comments She has progressed towards her goals and continues to be appropriate for skilled PT to reach stated goals. Her function overall has improved but does still have issues at time with mobility which is to be expected for her. She did have good release of fascial tensions treated today.

## 2025-07-02 ENCOUNTER — THERAPY VISIT (OUTPATIENT)
Dept: PHYSICAL THERAPY | Facility: REHABILITATION | Age: 60
End: 2025-07-02
Payer: MEDICARE

## 2025-07-02 DIAGNOSIS — M54.50 CHRONIC BILATERAL LOW BACK PAIN WITHOUT SCIATICA: ICD-10-CM

## 2025-07-02 DIAGNOSIS — M79.18 MYOFASCIAL PAIN: Primary | ICD-10-CM

## 2025-07-02 DIAGNOSIS — M54.2 CERVICAL PAIN: ICD-10-CM

## 2025-07-02 DIAGNOSIS — G89.29 CHRONIC BILATERAL LOW BACK PAIN WITHOUT SCIATICA: ICD-10-CM

## 2025-07-02 PROCEDURE — 97140 MANUAL THERAPY 1/> REGIONS: CPT | Mod: GP | Performed by: PHYSICAL THERAPIST

## 2025-07-16 ENCOUNTER — THERAPY VISIT (OUTPATIENT)
Dept: PHYSICAL THERAPY | Facility: REHABILITATION | Age: 60
End: 2025-07-16
Payer: MEDICARE

## 2025-07-16 DIAGNOSIS — M54.2 CERVICAL PAIN: ICD-10-CM

## 2025-07-16 DIAGNOSIS — M79.18 MYOFASCIAL PAIN: Primary | ICD-10-CM

## 2025-07-16 DIAGNOSIS — M54.50 CHRONIC BILATERAL LOW BACK PAIN WITHOUT SCIATICA: ICD-10-CM

## 2025-07-16 DIAGNOSIS — G89.29 CHRONIC BILATERAL LOW BACK PAIN WITHOUT SCIATICA: ICD-10-CM

## 2025-07-16 PROCEDURE — 97140 MANUAL THERAPY 1/> REGIONS: CPT | Mod: GP | Performed by: PHYSICAL THERAPIST

## 2025-07-30 ENCOUNTER — THERAPY VISIT (OUTPATIENT)
Dept: PHYSICAL THERAPY | Facility: REHABILITATION | Age: 60
End: 2025-07-30
Payer: MEDICARE

## 2025-07-30 DIAGNOSIS — M54.50 CHRONIC BILATERAL LOW BACK PAIN WITHOUT SCIATICA: ICD-10-CM

## 2025-07-30 DIAGNOSIS — M54.2 CERVICAL PAIN: ICD-10-CM

## 2025-07-30 DIAGNOSIS — G89.29 CHRONIC BILATERAL LOW BACK PAIN WITHOUT SCIATICA: ICD-10-CM

## 2025-07-30 DIAGNOSIS — M79.18 MYOFASCIAL PAIN: Primary | ICD-10-CM

## 2025-07-30 PROCEDURE — 97140 MANUAL THERAPY 1/> REGIONS: CPT | Mod: GP | Performed by: PHYSICAL THERAPIST

## 2025-08-05 ENCOUNTER — THERAPY VISIT (OUTPATIENT)
Dept: PHYSICAL THERAPY | Facility: REHABILITATION | Age: 60
End: 2025-08-05
Payer: MEDICARE

## 2025-08-05 DIAGNOSIS — M54.2 CERVICAL PAIN: ICD-10-CM

## 2025-08-05 DIAGNOSIS — M54.50 CHRONIC BILATERAL LOW BACK PAIN WITHOUT SCIATICA: ICD-10-CM

## 2025-08-05 DIAGNOSIS — G89.29 CHRONIC BILATERAL LOW BACK PAIN WITHOUT SCIATICA: ICD-10-CM

## 2025-08-05 DIAGNOSIS — M79.18 MYOFASCIAL PAIN: Primary | ICD-10-CM

## 2025-08-05 PROCEDURE — 97140 MANUAL THERAPY 1/> REGIONS: CPT | Mod: GP | Performed by: PHYSICAL THERAPIST

## 2025-09-03 ENCOUNTER — THERAPY VISIT (OUTPATIENT)
Dept: PHYSICAL THERAPY | Facility: REHABILITATION | Age: 60
End: 2025-09-03
Payer: MEDICARE

## 2025-09-03 DIAGNOSIS — G89.29 CHRONIC BILATERAL LOW BACK PAIN WITHOUT SCIATICA: ICD-10-CM

## 2025-09-03 DIAGNOSIS — M79.18 MYOFASCIAL PAIN: Primary | ICD-10-CM

## 2025-09-03 DIAGNOSIS — M54.50 CHRONIC BILATERAL LOW BACK PAIN WITHOUT SCIATICA: ICD-10-CM

## 2025-09-03 DIAGNOSIS — M72.2 PLANTAR FASCIITIS: ICD-10-CM

## 2025-09-03 DIAGNOSIS — M54.2 CERVICAL PAIN: ICD-10-CM

## 2025-09-03 PROCEDURE — 97140 MANUAL THERAPY 1/> REGIONS: CPT | Mod: GP | Performed by: PHYSICAL THERAPIST
